# Patient Record
Sex: FEMALE | Race: WHITE | NOT HISPANIC OR LATINO | Employment: OTHER | URBAN - METROPOLITAN AREA
[De-identification: names, ages, dates, MRNs, and addresses within clinical notes are randomized per-mention and may not be internally consistent; named-entity substitution may affect disease eponyms.]

---

## 2017-01-17 ENCOUNTER — COMMUNICATION - HEALTHEAST (OUTPATIENT)
Dept: FAMILY MEDICINE | Facility: CLINIC | Age: 67
End: 2017-01-17

## 2017-03-10 ENCOUNTER — RECORDS - HEALTHEAST (OUTPATIENT)
Dept: ADMINISTRATIVE | Facility: OTHER | Age: 67
End: 2017-03-10

## 2017-04-06 ENCOUNTER — COMMUNICATION - HEALTHEAST (OUTPATIENT)
Dept: FAMILY MEDICINE | Facility: CLINIC | Age: 67
End: 2017-04-06

## 2017-05-01 ENCOUNTER — COMMUNICATION - HEALTHEAST (OUTPATIENT)
Dept: ADMINISTRATIVE | Facility: CLINIC | Age: 67
End: 2017-05-01

## 2017-05-05 ENCOUNTER — COMMUNICATION - HEALTHEAST (OUTPATIENT)
Dept: FAMILY MEDICINE | Facility: CLINIC | Age: 67
End: 2017-05-05

## 2017-05-09 ENCOUNTER — AMBULATORY - HEALTHEAST (OUTPATIENT)
Dept: ENDOCRINOLOGY | Facility: CLINIC | Age: 67
End: 2017-05-09

## 2017-05-09 DIAGNOSIS — M81.0 OSTEOPOROSIS: ICD-10-CM

## 2017-05-19 ENCOUNTER — OFFICE VISIT - HEALTHEAST (OUTPATIENT)
Dept: FAMILY MEDICINE | Facility: CLINIC | Age: 67
End: 2017-05-19

## 2017-05-19 DIAGNOSIS — Z91.09 OTHER ALLERGY, OTHER THAN TO MEDICINAL AGENTS: ICD-10-CM

## 2017-05-19 DIAGNOSIS — G25.81 RESTLESS LEGS SYNDROME (RLS): ICD-10-CM

## 2017-05-19 DIAGNOSIS — M81.0 OSTEOPOROSIS: ICD-10-CM

## 2017-05-19 DIAGNOSIS — Z00.00 HEALTH CARE MAINTENANCE: ICD-10-CM

## 2017-05-19 ASSESSMENT — MIFFLIN-ST. JEOR: SCORE: 1085.83

## 2017-06-26 ENCOUNTER — COMMUNICATION - HEALTHEAST (OUTPATIENT)
Dept: FAMILY MEDICINE | Facility: CLINIC | Age: 67
End: 2017-06-26

## 2017-06-26 DIAGNOSIS — R93.7 ABNORMAL BONE DENSITY SCREENING: ICD-10-CM

## 2017-06-26 DIAGNOSIS — M81.0 OSTEOPOROSIS: ICD-10-CM

## 2017-08-14 ENCOUNTER — COMMUNICATION - HEALTHEAST (OUTPATIENT)
Dept: FAMILY MEDICINE | Facility: CLINIC | Age: 67
End: 2017-08-14

## 2017-08-14 DIAGNOSIS — G25.81 RESTLESS LEGS SYNDROME (RLS): ICD-10-CM

## 2017-09-08 ENCOUNTER — RECORDS - HEALTHEAST (OUTPATIENT)
Dept: ADMINISTRATIVE | Facility: OTHER | Age: 67
End: 2017-09-08

## 2017-09-14 ENCOUNTER — RECORDS - HEALTHEAST (OUTPATIENT)
Dept: ADMINISTRATIVE | Facility: OTHER | Age: 67
End: 2017-09-14

## 2017-09-18 ENCOUNTER — COMMUNICATION - HEALTHEAST (OUTPATIENT)
Dept: FAMILY MEDICINE | Facility: CLINIC | Age: 67
End: 2017-09-18

## 2017-09-29 ENCOUNTER — OFFICE VISIT - HEALTHEAST (OUTPATIENT)
Dept: FAMILY MEDICINE | Facility: CLINIC | Age: 67
End: 2017-09-29

## 2017-09-29 DIAGNOSIS — Z23 NEED FOR IMMUNIZATION AGAINST INFLUENZA: ICD-10-CM

## 2017-09-29 DIAGNOSIS — Z01.810 PREOP CARDIOVASCULAR EXAM: ICD-10-CM

## 2017-09-29 ASSESSMENT — MIFFLIN-ST. JEOR: SCORE: 1091.27

## 2017-10-02 ENCOUNTER — COMMUNICATION - HEALTHEAST (OUTPATIENT)
Dept: FAMILY MEDICINE | Facility: CLINIC | Age: 67
End: 2017-10-02

## 2017-10-24 ENCOUNTER — RECORDS - HEALTHEAST (OUTPATIENT)
Dept: ADMINISTRATIVE | Facility: OTHER | Age: 67
End: 2017-10-24

## 2017-10-24 ENCOUNTER — OFFICE VISIT - HEALTHEAST (OUTPATIENT)
Dept: INTERNAL MEDICINE | Facility: CLINIC | Age: 67
End: 2017-10-24

## 2017-10-24 ENCOUNTER — RECORDS - HEALTHEAST (OUTPATIENT)
Dept: BONE DENSITY | Facility: CLINIC | Age: 67
End: 2017-10-24

## 2017-10-24 DIAGNOSIS — M81.0 OSTEOPOROSIS: ICD-10-CM

## 2017-10-24 DIAGNOSIS — M81.0 AGE-RELATED OSTEOPOROSIS WITHOUT CURRENT PATHOLOGICAL FRACTURE: ICD-10-CM

## 2017-10-24 DIAGNOSIS — R93.7 ABNORMAL FINDINGS ON DIAGNOSTIC IMAGING OF OTHER PARTS OF MUSCULOSKELETAL SYSTEM: ICD-10-CM

## 2017-11-14 ENCOUNTER — AMBULATORY - HEALTHEAST (OUTPATIENT)
Dept: ENDOCRINOLOGY | Facility: CLINIC | Age: 67
End: 2017-11-14

## 2017-11-14 DIAGNOSIS — M81.0 OSTEOPOROSIS: ICD-10-CM

## 2017-11-15 ENCOUNTER — RECORDS - HEALTHEAST (OUTPATIENT)
Dept: ADMINISTRATIVE | Facility: OTHER | Age: 67
End: 2017-11-15

## 2018-04-17 ENCOUNTER — AMBULATORY - HEALTHEAST (OUTPATIENT)
Dept: ENDOCRINOLOGY | Facility: CLINIC | Age: 68
End: 2018-04-17

## 2018-04-17 DIAGNOSIS — M81.0 OSTEOPOROSIS: ICD-10-CM

## 2018-04-25 ENCOUNTER — COMMUNICATION - HEALTHEAST (OUTPATIENT)
Dept: FAMILY MEDICINE | Facility: CLINIC | Age: 68
End: 2018-04-25

## 2018-05-10 ENCOUNTER — COMMUNICATION - HEALTHEAST (OUTPATIENT)
Dept: FAMILY MEDICINE | Facility: CLINIC | Age: 68
End: 2018-05-10

## 2018-05-10 DIAGNOSIS — G25.81 RESTLESS LEGS SYNDROME (RLS): ICD-10-CM

## 2018-05-14 ENCOUNTER — COMMUNICATION - HEALTHEAST (OUTPATIENT)
Dept: FAMILY MEDICINE | Facility: CLINIC | Age: 68
End: 2018-05-14

## 2018-05-14 DIAGNOSIS — G25.81 RESTLESS LEGS SYNDROME (RLS): ICD-10-CM

## 2018-05-21 ENCOUNTER — OFFICE VISIT - HEALTHEAST (OUTPATIENT)
Dept: FAMILY MEDICINE | Facility: CLINIC | Age: 68
End: 2018-05-21

## 2018-05-21 DIAGNOSIS — Z00.00 ROUTINE GENERAL MEDICAL EXAMINATION AT A HEALTH CARE FACILITY: ICD-10-CM

## 2018-05-21 DIAGNOSIS — H91.90 HEARING LOSS: ICD-10-CM

## 2018-05-21 DIAGNOSIS — G25.81 RESTLESS LEGS SYNDROME (RLS): ICD-10-CM

## 2018-05-21 DIAGNOSIS — Z91.09 ENVIRONMENTAL ALLERGIES: ICD-10-CM

## 2018-05-21 DIAGNOSIS — M81.0 AGE-RELATED OSTEOPOROSIS WITHOUT CURRENT PATHOLOGICAL FRACTURE: ICD-10-CM

## 2018-05-21 LAB
CHOLEST SERPL-MCNC: 217 MG/DL
FASTING STATUS PATIENT QL REPORTED: YES
FASTING STATUS PATIENT QL REPORTED: YES
GLUCOSE BLD-MCNC: 93 MG/DL (ref 70–99)
HDLC SERPL-MCNC: 75 MG/DL
LDLC SERPL CALC-MCNC: 126 MG/DL
TRIGL SERPL-MCNC: 82 MG/DL

## 2018-05-21 ASSESSMENT — MIFFLIN-ST. JEOR: SCORE: 1078.77

## 2018-05-22 LAB — HCV AB SERPL QL IA: NEGATIVE

## 2018-08-06 ENCOUNTER — COMMUNICATION - HEALTHEAST (OUTPATIENT)
Dept: FAMILY MEDICINE | Facility: CLINIC | Age: 68
End: 2018-08-06

## 2018-08-06 DIAGNOSIS — G25.81 RESTLESS LEGS SYNDROME (RLS): ICD-10-CM

## 2018-09-17 ENCOUNTER — OFFICE VISIT - HEALTHEAST (OUTPATIENT)
Dept: FAMILY MEDICINE | Facility: CLINIC | Age: 68
End: 2018-09-17

## 2018-09-17 DIAGNOSIS — G25.81 RESTLESS LEGS SYNDROME (RLS): ICD-10-CM

## 2018-09-17 DIAGNOSIS — N89.8 VAGINAL IRRITATION: ICD-10-CM

## 2018-09-17 LAB
CLUE CELLS: NORMAL
TRICHOMONAS, WET PREP: NORMAL
YEAST, WET PREP: NORMAL

## 2018-10-11 ENCOUNTER — COMMUNICATION - HEALTHEAST (OUTPATIENT)
Dept: INTERNAL MEDICINE | Facility: CLINIC | Age: 68
End: 2018-10-11

## 2018-10-23 ENCOUNTER — AMBULATORY - HEALTHEAST (OUTPATIENT)
Dept: NURSING | Facility: CLINIC | Age: 68
End: 2018-10-23

## 2018-10-25 ENCOUNTER — AMBULATORY - HEALTHEAST (OUTPATIENT)
Dept: NURSING | Facility: CLINIC | Age: 68
End: 2018-10-25

## 2018-10-25 ENCOUNTER — AMBULATORY - HEALTHEAST (OUTPATIENT)
Dept: INTERNAL MEDICINE | Facility: CLINIC | Age: 68
End: 2018-10-25

## 2018-10-25 DIAGNOSIS — M81.0 AGE-RELATED OSTEOPOROSIS WITHOUT CURRENT PATHOLOGICAL FRACTURE: ICD-10-CM

## 2018-10-26 ENCOUNTER — COMMUNICATION - HEALTHEAST (OUTPATIENT)
Dept: ADMINISTRATIVE | Facility: CLINIC | Age: 68
End: 2018-10-26

## 2018-10-30 ENCOUNTER — AMBULATORY - HEALTHEAST (OUTPATIENT)
Dept: NURSING | Facility: CLINIC | Age: 68
End: 2018-10-30

## 2018-11-15 ENCOUNTER — OFFICE VISIT - HEALTHEAST (OUTPATIENT)
Dept: FAMILY MEDICINE | Facility: CLINIC | Age: 68
End: 2018-11-15

## 2018-11-15 DIAGNOSIS — J01.90 ACUTE SINUSITIS WITH SYMPTOMS > 10 DAYS: ICD-10-CM

## 2018-11-19 ENCOUNTER — RECORDS - HEALTHEAST (OUTPATIENT)
Dept: ADMINISTRATIVE | Facility: OTHER | Age: 68
End: 2018-11-19

## 2018-11-28 ENCOUNTER — RECORDS - HEALTHEAST (OUTPATIENT)
Dept: ADMINISTRATIVE | Facility: OTHER | Age: 68
End: 2018-11-28

## 2019-05-28 ENCOUNTER — OFFICE VISIT - HEALTHEAST (OUTPATIENT)
Dept: FAMILY MEDICINE | Facility: CLINIC | Age: 69
End: 2019-05-28

## 2019-05-28 DIAGNOSIS — Z91.09 ENVIRONMENTAL ALLERGIES: ICD-10-CM

## 2019-05-28 DIAGNOSIS — Z00.00 ROUTINE GENERAL MEDICAL EXAMINATION AT A HEALTH CARE FACILITY: ICD-10-CM

## 2019-05-28 DIAGNOSIS — M81.0 AGE-RELATED OSTEOPOROSIS WITHOUT CURRENT PATHOLOGICAL FRACTURE: ICD-10-CM

## 2019-05-28 DIAGNOSIS — J32.9 CHRONIC SINUSITIS, UNSPECIFIED LOCATION: ICD-10-CM

## 2019-05-28 DIAGNOSIS — H91.93 BILATERAL HEARING LOSS, UNSPECIFIED HEARING LOSS TYPE: ICD-10-CM

## 2019-05-28 DIAGNOSIS — H90.3 BILATERAL SENSORINEURAL HEARING LOSS: ICD-10-CM

## 2019-05-28 DIAGNOSIS — G25.81 RESTLESS LEGS SYNDROME (RLS): ICD-10-CM

## 2019-05-28 ASSESSMENT — MIFFLIN-ST. JEOR: SCORE: 1084.54

## 2019-09-30 ENCOUNTER — COMMUNICATION - HEALTHEAST (OUTPATIENT)
Dept: FAMILY MEDICINE | Facility: CLINIC | Age: 69
End: 2019-09-30

## 2019-10-17 ENCOUNTER — OFFICE VISIT - HEALTHEAST (OUTPATIENT)
Dept: FAMILY MEDICINE | Facility: CLINIC | Age: 69
End: 2019-10-17

## 2019-10-17 DIAGNOSIS — Z76.89 ESTABLISHING CARE WITH NEW DOCTOR, ENCOUNTER FOR: ICD-10-CM

## 2019-10-17 DIAGNOSIS — J06.9 VIRAL URI WITH COUGH: ICD-10-CM

## 2019-10-17 DIAGNOSIS — Z01.818 ENCOUNTER FOR PREOPERATIVE EXAMINATION FOR GENERAL SURGICAL PROCEDURE: ICD-10-CM

## 2019-10-17 DIAGNOSIS — G25.81 RESTLESS LEGS SYNDROME (RLS): ICD-10-CM

## 2019-10-17 DIAGNOSIS — M81.0 AGE-RELATED OSTEOPOROSIS WITHOUT CURRENT PATHOLOGICAL FRACTURE: ICD-10-CM

## 2019-10-17 LAB
ALBUMIN SERPL-MCNC: 4 G/DL (ref 3.5–5)
ALP SERPL-CCNC: 102 U/L (ref 45–120)
ALT SERPL W P-5'-P-CCNC: 18 U/L (ref 0–45)
ANION GAP SERPL CALCULATED.3IONS-SCNC: 12 MMOL/L (ref 5–18)
AST SERPL W P-5'-P-CCNC: 17 U/L (ref 0–40)
BILIRUB SERPL-MCNC: 0.3 MG/DL (ref 0–1)
BUN SERPL-MCNC: 14 MG/DL (ref 8–22)
CALCIUM SERPL-MCNC: 9.7 MG/DL (ref 8.5–10.5)
CHLORIDE BLD-SCNC: 102 MMOL/L (ref 98–107)
CO2 SERPL-SCNC: 26 MMOL/L (ref 22–31)
CREAT SERPL-MCNC: 0.67 MG/DL (ref 0.6–1.1)
ERYTHROCYTE [DISTWIDTH] IN BLOOD BY AUTOMATED COUNT: 11.2 % (ref 11–14.5)
FERRITIN SERPL-MCNC: 88 NG/ML (ref 10–130)
GFR SERPL CREATININE-BSD FRML MDRD: >60 ML/MIN/1.73M2
GLUCOSE BLD-MCNC: 102 MG/DL (ref 70–125)
HCT VFR BLD AUTO: 42.3 % (ref 35–47)
HGB BLD-MCNC: 14.3 G/DL (ref 12–16)
MCH RBC QN AUTO: 30.4 PG (ref 27–34)
MCHC RBC AUTO-ENTMCNC: 33.7 G/DL (ref 32–36)
MCV RBC AUTO: 90 FL (ref 80–100)
PLATELET # BLD AUTO: 249 THOU/UL (ref 140–440)
PMV BLD AUTO: 7.6 FL (ref 7–10)
POTASSIUM BLD-SCNC: 4.4 MMOL/L (ref 3.5–5)
PROT SERPL-MCNC: 6.7 G/DL (ref 6–8)
RBC # BLD AUTO: 4.7 MILL/UL (ref 3.8–5.4)
SODIUM SERPL-SCNC: 140 MMOL/L (ref 136–145)
WBC: 8.7 THOU/UL (ref 4–11)

## 2019-10-17 ASSESSMENT — MIFFLIN-ST. JEOR: SCORE: 1098.34

## 2019-10-18 LAB
25(OH)D3 SERPL-MCNC: 38.1 NG/ML (ref 30–80)
25(OH)D3 SERPL-MCNC: 38.1 NG/ML (ref 30–80)

## 2019-11-20 ENCOUNTER — RECORDS - HEALTHEAST (OUTPATIENT)
Dept: ADMINISTRATIVE | Facility: OTHER | Age: 69
End: 2019-11-20

## 2019-11-20 ENCOUNTER — OFFICE VISIT - HEALTHEAST (OUTPATIENT)
Dept: INTERNAL MEDICINE | Facility: CLINIC | Age: 69
End: 2019-11-20

## 2019-11-20 ENCOUNTER — RECORDS - HEALTHEAST (OUTPATIENT)
Dept: BONE DENSITY | Facility: CLINIC | Age: 69
End: 2019-11-20

## 2019-11-20 DIAGNOSIS — M81.0 AGE-RELATED OSTEOPOROSIS WITHOUT CURRENT PATHOLOGICAL FRACTURE: ICD-10-CM

## 2019-11-20 DIAGNOSIS — Z92.29 PERSONAL HISTORY OF OTHER DRUG THERAPY: ICD-10-CM

## 2019-11-21 ENCOUNTER — COMMUNICATION - HEALTHEAST (OUTPATIENT)
Dept: INTERNAL MEDICINE | Facility: CLINIC | Age: 69
End: 2019-11-21

## 2019-11-26 ENCOUNTER — AMBULATORY - HEALTHEAST (OUTPATIENT)
Dept: NURSING | Facility: CLINIC | Age: 69
End: 2019-11-26

## 2019-12-10 ENCOUNTER — RECORDS - HEALTHEAST (OUTPATIENT)
Dept: ADMINISTRATIVE | Facility: OTHER | Age: 69
End: 2019-12-10

## 2020-01-17 ENCOUNTER — OFFICE VISIT - HEALTHEAST (OUTPATIENT)
Dept: FAMILY MEDICINE | Facility: CLINIC | Age: 70
End: 2020-01-17

## 2020-01-17 DIAGNOSIS — M67.432 GANGLION CYST OF WRIST, LEFT: ICD-10-CM

## 2020-01-17 DIAGNOSIS — G25.81 RESTLESS LEGS SYNDROME (RLS): ICD-10-CM

## 2020-04-10 ENCOUNTER — OFFICE VISIT - HEALTHEAST (OUTPATIENT)
Dept: FAMILY MEDICINE | Facility: CLINIC | Age: 70
End: 2020-04-10

## 2020-04-10 DIAGNOSIS — R10.13 ABDOMINAL PAIN, EPIGASTRIC: ICD-10-CM

## 2020-04-10 ASSESSMENT — PATIENT HEALTH QUESTIONNAIRE - PHQ9: SUM OF ALL RESPONSES TO PHQ QUESTIONS 1-9: 0

## 2020-04-24 ENCOUNTER — COMMUNICATION - HEALTHEAST (OUTPATIENT)
Dept: FAMILY MEDICINE | Facility: CLINIC | Age: 70
End: 2020-04-24

## 2020-05-02 ENCOUNTER — COMMUNICATION - HEALTHEAST (OUTPATIENT)
Dept: FAMILY MEDICINE | Facility: CLINIC | Age: 70
End: 2020-05-02

## 2020-05-02 DIAGNOSIS — R10.13 ABDOMINAL PAIN, EPIGASTRIC: ICD-10-CM

## 2020-05-29 ENCOUNTER — AMBULATORY - HEALTHEAST (OUTPATIENT)
Dept: NURSING | Facility: CLINIC | Age: 70
End: 2020-05-29

## 2020-06-09 ENCOUNTER — COMMUNICATION - HEALTHEAST (OUTPATIENT)
Dept: FAMILY MEDICINE | Facility: CLINIC | Age: 70
End: 2020-06-09

## 2020-06-09 DIAGNOSIS — J32.9 CHRONIC SINUSITIS, UNSPECIFIED LOCATION: ICD-10-CM

## 2020-06-09 DIAGNOSIS — Z91.09 ENVIRONMENTAL ALLERGIES: ICD-10-CM

## 2020-06-11 RX ORDER — AZELASTINE 1 MG/ML
SPRAY, METERED NASAL
Qty: 30 ML | Refills: 12 | Status: SHIPPED | OUTPATIENT
Start: 2020-06-11 | End: 2022-04-15

## 2020-07-13 ENCOUNTER — OFFICE VISIT - HEALTHEAST (OUTPATIENT)
Dept: FAMILY MEDICINE | Facility: CLINIC | Age: 70
End: 2020-07-13

## 2020-07-13 DIAGNOSIS — G25.81 RESTLESS LEGS SYNDROME (RLS): ICD-10-CM

## 2020-07-13 DIAGNOSIS — K62.1 ANORECTAL POLYP: ICD-10-CM

## 2020-07-13 DIAGNOSIS — K62.0 ANORECTAL POLYP: ICD-10-CM

## 2020-07-13 DIAGNOSIS — Z00.01 ENCOUNTER FOR GENERAL ADULT MEDICAL EXAMINATION WITH ABNORMAL FINDINGS: ICD-10-CM

## 2020-07-13 DIAGNOSIS — R10.13 ABDOMINAL PAIN, EPIGASTRIC: ICD-10-CM

## 2020-07-13 DIAGNOSIS — M81.0 AGE-RELATED OSTEOPOROSIS WITHOUT CURRENT PATHOLOGICAL FRACTURE: ICD-10-CM

## 2020-07-13 ASSESSMENT — MIFFLIN-ST. JEOR: SCORE: 1074.72

## 2020-07-13 ASSESSMENT — ANXIETY QUESTIONNAIRES
GAD7 TOTAL SCORE: 2
2. NOT BEING ABLE TO STOP OR CONTROL WORRYING: NOT AT ALL
4. TROUBLE RELAXING: NOT AT ALL
IF YOU CHECKED OFF ANY PROBLEMS ON THIS QUESTIONNAIRE, HOW DIFFICULT HAVE THESE PROBLEMS MADE IT FOR YOU TO DO YOUR WORK, TAKE CARE OF THINGS AT HOME, OR GET ALONG WITH OTHER PEOPLE: SOMEWHAT DIFFICULT
1. FEELING NERVOUS, ANXIOUS, OR ON EDGE: SEVERAL DAYS
5. BEING SO RESTLESS THAT IT IS HARD TO SIT STILL: NOT AT ALL
7. FEELING AFRAID AS IF SOMETHING AWFUL MIGHT HAPPEN: NOT AT ALL
6. BECOMING EASILY ANNOYED OR IRRITABLE: NOT AT ALL
3. WORRYING TOO MUCH ABOUT DIFFERENT THINGS: SEVERAL DAYS

## 2020-07-13 ASSESSMENT — PATIENT HEALTH QUESTIONNAIRE - PHQ9: SUM OF ALL RESPONSES TO PHQ QUESTIONS 1-9: 2

## 2020-07-29 ENCOUNTER — AMBULATORY - HEALTHEAST (OUTPATIENT)
Dept: LAB | Facility: CLINIC | Age: 70
End: 2020-07-29

## 2020-07-29 DIAGNOSIS — Z00.01 ENCOUNTER FOR GENERAL ADULT MEDICAL EXAMINATION WITH ABNORMAL FINDINGS: ICD-10-CM

## 2020-07-29 LAB
ALBUMIN SERPL-MCNC: 4.2 G/DL (ref 3.5–5)
ALP SERPL-CCNC: 70 U/L (ref 45–120)
ALT SERPL W P-5'-P-CCNC: 22 U/L (ref 0–45)
ANION GAP SERPL CALCULATED.3IONS-SCNC: 7 MMOL/L (ref 5–18)
AST SERPL W P-5'-P-CCNC: 22 U/L (ref 0–40)
BILIRUB SERPL-MCNC: 1 MG/DL (ref 0–1)
BUN SERPL-MCNC: 16 MG/DL (ref 8–22)
CALCIUM SERPL-MCNC: 8.9 MG/DL (ref 8.5–10.5)
CHLORIDE BLD-SCNC: 102 MMOL/L (ref 98–107)
CHOLEST SERPL-MCNC: 255 MG/DL
CO2 SERPL-SCNC: 32 MMOL/L (ref 22–31)
CREAT SERPL-MCNC: 0.71 MG/DL (ref 0.6–1.1)
FASTING STATUS PATIENT QL REPORTED: YES
GFR SERPL CREATININE-BSD FRML MDRD: >60 ML/MIN/1.73M2
GLUCOSE BLD-MCNC: 80 MG/DL (ref 70–125)
HDLC SERPL-MCNC: 76 MG/DL
LDLC SERPL CALC-MCNC: 164 MG/DL
POTASSIUM BLD-SCNC: 4.2 MMOL/L (ref 3.5–5)
PROT SERPL-MCNC: 6.8 G/DL (ref 6–8)
SODIUM SERPL-SCNC: 141 MMOL/L (ref 136–145)
TRIGL SERPL-MCNC: 77 MG/DL

## 2020-08-03 ENCOUNTER — COMMUNICATION - HEALTHEAST (OUTPATIENT)
Dept: FAMILY MEDICINE | Facility: CLINIC | Age: 70
End: 2020-08-03

## 2020-08-03 DIAGNOSIS — E78.5 HYPERLIPIDEMIA, UNSPECIFIED HYPERLIPIDEMIA TYPE: ICD-10-CM

## 2020-08-26 ENCOUNTER — COMMUNICATION - HEALTHEAST (OUTPATIENT)
Dept: FAMILY MEDICINE | Facility: CLINIC | Age: 70
End: 2020-08-26

## 2020-08-26 DIAGNOSIS — E78.5 HYPERLIPIDEMIA, UNSPECIFIED HYPERLIPIDEMIA TYPE: ICD-10-CM

## 2020-11-30 ENCOUNTER — COMMUNICATION - HEALTHEAST (OUTPATIENT)
Dept: FAMILY MEDICINE | Facility: CLINIC | Age: 70
End: 2020-11-30

## 2020-11-30 DIAGNOSIS — G25.81 RESTLESS LEGS SYNDROME (RLS): ICD-10-CM

## 2020-12-01 RX ORDER — PRAMIPEXOLE DIHYDROCHLORIDE 0.25 MG/1
TABLET ORAL
Qty: 180 TABLET | Refills: 1 | Status: SHIPPED | OUTPATIENT
Start: 2020-12-01 | End: 2021-07-19

## 2020-12-07 ENCOUNTER — AMBULATORY - HEALTHEAST (OUTPATIENT)
Dept: NURSING | Facility: CLINIC | Age: 70
End: 2020-12-07

## 2020-12-23 ENCOUNTER — RECORDS - HEALTHEAST (OUTPATIENT)
Dept: ADMINISTRATIVE | Facility: OTHER | Age: 70
End: 2020-12-23

## 2020-12-29 ENCOUNTER — COMMUNICATION - HEALTHEAST (OUTPATIENT)
Dept: FAMILY MEDICINE | Facility: CLINIC | Age: 70
End: 2020-12-29

## 2020-12-31 ENCOUNTER — RECORDS - HEALTHEAST (OUTPATIENT)
Dept: GENERAL RADIOLOGY | Facility: CLINIC | Age: 70
End: 2020-12-31

## 2020-12-31 ENCOUNTER — OFFICE VISIT - HEALTHEAST (OUTPATIENT)
Dept: FAMILY MEDICINE | Facility: CLINIC | Age: 70
End: 2020-12-31

## 2020-12-31 DIAGNOSIS — M53.3 SACROCOCCYGEAL DISORDERS, NOT ELSEWHERE CLASSIFIED: ICD-10-CM

## 2020-12-31 DIAGNOSIS — M53.3 PAIN IN THE COCCYX: ICD-10-CM
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
## 2021-02-26 ENCOUNTER — RECORDS - HEALTHEAST (OUTPATIENT)
Dept: ADMINISTRATIVE | Facility: OTHER | Age: 71
End: 2021-02-26

## 2021-04-09 ENCOUNTER — RECORDS - HEALTHEAST (OUTPATIENT)
Dept: ADMINISTRATIVE | Facility: OTHER | Age: 71
End: 2021-04-09

## 2021-05-03 ENCOUNTER — COMMUNICATION - HEALTHEAST (OUTPATIENT)
Dept: SCHEDULING | Facility: CLINIC | Age: 71
End: 2021-05-03

## 2021-05-03 ENCOUNTER — OFFICE VISIT - HEALTHEAST (OUTPATIENT)
Dept: FAMILY MEDICINE | Facility: CLINIC | Age: 71
End: 2021-05-03

## 2021-05-03 DIAGNOSIS — N93.9 VAGINAL BLEEDING: ICD-10-CM

## 2021-05-03 LAB
ALBUMIN SERPL-MCNC: 4.7 G/DL (ref 3.5–5)
ALBUMIN UR-MCNC: NEGATIVE G/DL
ALP SERPL-CCNC: 76 U/L (ref 45–120)
ALT SERPL W P-5'-P-CCNC: 43 U/L (ref 0–45)
ANION GAP SERPL CALCULATED.3IONS-SCNC: 12 MMOL/L (ref 5–18)
APPEARANCE UR: CLEAR
AST SERPL W P-5'-P-CCNC: 34 U/L (ref 0–40)
BASOPHILS # BLD AUTO: 0 THOU/UL (ref 0–0.2)
BASOPHILS NFR BLD AUTO: 0 % (ref 0–2)
BILIRUB SERPL-MCNC: 0.9 MG/DL (ref 0–1)
BILIRUB UR QL STRIP: NEGATIVE
BUN SERPL-MCNC: 14 MG/DL (ref 8–28)
CALCIUM SERPL-MCNC: 9.9 MG/DL (ref 8.5–10.5)
CHLORIDE BLD-SCNC: 102 MMOL/L (ref 98–107)
CO2 SERPL-SCNC: 27 MMOL/L (ref 22–31)
COLOR UR AUTO: YELLOW
CREAT SERPL-MCNC: 0.73 MG/DL (ref 0.6–1.1)
EOSINOPHIL # BLD AUTO: 0 THOU/UL (ref 0–0.4)
EOSINOPHIL NFR BLD AUTO: 0 % (ref 0–6)
ERYTHROCYTE [DISTWIDTH] IN BLOOD BY AUTOMATED COUNT: 11.6 % (ref 11–14.5)
ERYTHROCYTE [SEDIMENTATION RATE] IN BLOOD BY WESTERGREN METHOD: 6 MM/HR (ref 0–20)
GFR SERPL CREATININE-BSD FRML MDRD: >60 ML/MIN/1.73M2
GLUCOSE BLD-MCNC: 114 MG/DL (ref 70–125)
GLUCOSE UR STRIP-MCNC: NEGATIVE MG/DL
HCT VFR BLD AUTO: 44.2 % (ref 35–47)
HGB BLD-MCNC: 15 G/DL (ref 12–16)
HGB UR QL STRIP: NEGATIVE
IMM GRANULOCYTES # BLD: 0 THOU/UL
IMM GRANULOCYTES NFR BLD: 0 %
KETONES UR STRIP-MCNC: ABNORMAL MG/DL
LDH SERPL L TO P-CCNC: 183 U/L (ref 125–220)
LEUKOCYTE ESTERASE UR QL STRIP: NEGATIVE
LYMPHOCYTES # BLD AUTO: 1.8 THOU/UL (ref 0.8–4.4)
LYMPHOCYTES NFR BLD AUTO: 24 % (ref 20–40)
MCH RBC QN AUTO: 29.9 PG (ref 27–34)
MCHC RBC AUTO-ENTMCNC: 33.9 G/DL (ref 32–36)
MCV RBC AUTO: 88 FL (ref 80–100)
MONOCYTES # BLD AUTO: 0.4 THOU/UL (ref 0–0.9)
MONOCYTES NFR BLD AUTO: 5 % (ref 2–10)
NEUTROPHILS # BLD AUTO: 5.2 THOU/UL (ref 2–7.7)
NEUTROPHILS NFR BLD AUTO: 70 % (ref 50–70)
NITRATE UR QL: NEGATIVE
PH UR STRIP: 6 [PH] (ref 5–8)
PLATELET # BLD AUTO: 250 THOU/UL (ref 140–440)
PMV BLD AUTO: 10 FL (ref 7–10)
POTASSIUM BLD-SCNC: 4.6 MMOL/L (ref 3.5–5)
PROT SERPL-MCNC: 7.3 G/DL (ref 6–8)
RBC # BLD AUTO: 5.02 MILL/UL (ref 3.8–5.4)
SODIUM SERPL-SCNC: 141 MMOL/L (ref 136–145)
SP GR UR STRIP: 1.02 (ref 1–1.03)
UROBILINOGEN UR STRIP-ACNC: ABNORMAL
WBC: 7.4 THOU/UL (ref 4–11)

## 2021-05-06 ENCOUNTER — COMMUNICATION - HEALTHEAST (OUTPATIENT)
Dept: FAMILY MEDICINE | Facility: CLINIC | Age: 71
End: 2021-05-06

## 2021-05-10 ENCOUNTER — COMMUNICATION - HEALTHEAST (OUTPATIENT)
Dept: INTERNAL MEDICINE | Facility: CLINIC | Age: 71
End: 2021-05-10

## 2021-05-13 ENCOUNTER — OFFICE VISIT - HEALTHEAST (OUTPATIENT)
Dept: OBGYN | Facility: CLINIC | Age: 71
End: 2021-05-13

## 2021-05-13 DIAGNOSIS — N95.0 PMB (POSTMENOPAUSAL BLEEDING): ICD-10-CM

## 2021-05-19 ENCOUNTER — COMMUNICATION - HEALTHEAST (OUTPATIENT)
Dept: FAMILY MEDICINE | Facility: CLINIC | Age: 71
End: 2021-05-19

## 2021-05-19 DIAGNOSIS — E78.5 HYPERLIPIDEMIA, UNSPECIFIED HYPERLIPIDEMIA TYPE: ICD-10-CM

## 2021-05-20 RX ORDER — PRAVASTATIN SODIUM 20 MG
TABLET ORAL
Qty: 90 TABLET | Refills: 3 | Status: SHIPPED | OUTPATIENT
Start: 2021-05-20 | End: 2021-07-19

## 2021-05-26 VITALS — SYSTOLIC BLOOD PRESSURE: 110 MMHG | HEART RATE: 62 BPM | DIASTOLIC BLOOD PRESSURE: 60 MMHG

## 2021-05-26 ASSESSMENT — PATIENT HEALTH QUESTIONNAIRE - PHQ9: SUM OF ALL RESPONSES TO PHQ QUESTIONS 1-9: 2

## 2021-05-27 VITALS
WEIGHT: 136 LBS | HEART RATE: 71 BPM | DIASTOLIC BLOOD PRESSURE: 68 MMHG | OXYGEN SATURATION: 99 % | SYSTOLIC BLOOD PRESSURE: 134 MMHG

## 2021-05-27 VITALS
OXYGEN SATURATION: 97 % | HEART RATE: 90 BPM | WEIGHT: 135 LBS | DIASTOLIC BLOOD PRESSURE: 68 MMHG | SYSTOLIC BLOOD PRESSURE: 138 MMHG

## 2021-05-27 ASSESSMENT — PATIENT HEALTH QUESTIONNAIRE - PHQ9: SUM OF ALL RESPONSES TO PHQ QUESTIONS 1-9: 0

## 2021-05-28 ASSESSMENT — ANXIETY QUESTIONNAIRES: GAD7 TOTAL SCORE: 2

## 2021-05-29 NOTE — PROGRESS NOTES
Assessment and Plan:       1. Routine general medical examination at a health care facility  Immunizations reviewed and utd---she will do shingles vaccine at pharmacy  Colonosocopy reviewed 9/12--history of polyp, repeat 9/17.  Mammography reviewed --done 11/18--abnormality followed up with ultrasound, routine screening recommended.  Pap reviewed--normal 5/14 with negative HPV, no need to repeat based on age and risk factors per current guidelines.  Routine Dental and Eye care recommended  Discussed importance of regular exercise and appropriate calcium intake    2. Bilateral sensorineural hearing loss  Patient has had audiology appointment within the last few months and is scheduled for new hearing aids later this year.    3. Restless Legs Syndrome  Doing well with Mirapex, refilled today and revisit annually.  - pramipexole (MIRAPEX) 0.25 MG tablet; TAKE 2 TABLETs BY MOUTH AT BEDTIME 2 HRS BEFORE SYMPTOMS OCCUR (4MG MAX/DAY)  Dispense: 180 tablet; Refill: 1    4. Chronic sinusitis, unspecified location  Patient requested refill of Astelin today for seasonal allergies.  Refill provided.  Encouraged her to follow-up with any palms or concerns.  Prior to flying, try Afrin or similar nasal decongestant spray 1 hour prior to flight.  - azelastine (ASTELIN) 137 mcg (0.1 %) nasal spray; 2 sprays each nostril once daily  Dispense: 30 mL; Refill: 12    5. Environmental allergies  As above.  Over-the-counter Zyrtec as needed as well.  - azelastine (ASTELIN) 137 mcg (0.1 %) nasal spray; 2 sprays each nostril once daily  Dispense: 30 mL; Refill: 12    6. Age-related osteoporosis without current pathological fracture  Follow-up with endocrine/osteoporosis specialist end of year for repeat DEXA and reevaluation regarding restart of Prolia.  Recommend calcium intake (12-1500mg/day) with vitamin D (800-1000 IU daily) as well as weight bearing exercise to include strength and balance.  Calcium supplement should be calcium  CITRATE          The patient's current medical problems were reviewed.      The following health maintenance schedule was reviewed with the patient and provided in printed form in the after visit summary:   Health Maintenance   Topic Date Due     ZOSTER VACCINES (2 of 3) 02/13/2013     FALL RISK ASSESSMENT  05/21/2019     DXA SCAN  10/24/2019     MAMMOGRAM  11/28/2020     TD 18+ HE  05/04/2022     COLONOSCOPY  09/08/2022     ADVANCE DIRECTIVES DISCUSSED WITH PATIENT  05/21/2023     PNEUMOCOCCAL POLYSACCHARIDE VACCINE AGE 65 AND OVER  Completed     INFLUENZA VACCINE RULE BASED  Completed     PNEUMOCOCCAL CONJUGATE VACCINE FOR ADULTS (PCV13 OR PREVNAR)  Completed        Subjective:   Chief Complaint: Areli Higginbotham is an 68 y.o. female here for an Annual Wellness visit.   HPI: She is not fasting today and has no specific concerns other than she would like refill of her Astelin nasal spray for management of her seasonal allergies/sinusitis issues as well as her Mirapex which she is taking for her restless legs.  She reports both medications are working well.  She is compliant with meds and has no side effects or concerns.  Patient does have osteoporosis.  She was on Prolia for couple years.  Due to some unexplained jaw pain and concern about some early necrosis of the jaw, she was pulled off the Prolia for 12-month hiatus.  Patient is following up with specialist again this fall for repeat of her bone density and reconsidering Prolia.  She is working on her calcium intake, exercising routinely.  No history of fall or fracture.  Patient reports she is due for replacement of her hearing aids.  She saw audiology with only a 5% decrease since her last visit.  She is pleased with this.  Zoila reports no bowel or bladder concerns.  She remains sexually active though rare intercourse due to 's erectile dysfunction.  She has no vaginal concerns regarding pain, discharge, irritation.    Review of Systems:  Please see  above.  The rest of the review of systems are negative for all systems.    Patient Care Team:  Angelita Glasgow MD as PCP - General  Fabricio Miller MD as Physician (Internal Medicine)     Patient Active Problem List   Diagnosis     Rectal Polyps     Restless Legs Syndrome     Environmental allergies     Osteoporosis     Chronic Sinusitis     Hearing loss     Lumbago     Past Medical History:   Diagnosis Date     Osteoporosis      Restless legs syndrome (RLS)       Past Surgical History:   Procedure Laterality Date     TX BIOPSY OF BREAST, INCISIONAL      Description: Biopsy Breast Open;  Proc Date: 09/01/2003;     TX HEMORRHOIDECTOMY INTERNAL RUBBER BAND LIGATIONS      Description: Hemorrhoidectomy;  Proc Date: 06/01/1999;     TX KESSLER W/O FACETEC FORAMOT/DSKC 1/2 VRT SEG, LUMBAR      Description: Laminectomy Decompressive Up To Two Lumbar Segments;  Recorded: 04/29/2008;  Comments: L4-5     TX OPEN TX PHALANGEAL SHAFT FRACTURE PROX/MIDDLE EA      Description: Open Treatment Of Each Fracture Of Proximal Finger Phalanx;  Recorded: 04/29/2008;  Comments: R 5th finger w/ pinning     TX PARTIAL REMOVAL OF HYMEN      Description: Partial Hymenectomy;  Recorded: 04/29/2008;     TX REMOVE TOTAL DISC ARTHROPLASTY, LUMBAR, SINGLE      Description: Total Disc Arthroplasty Removal Lumbar Single Interspace;  Recorded: 04/29/2008;  Comments: L5      Family History   Problem Relation Age of Onset     Hypertension Mother      Cancer Mother         ovarian     Osteoporosis Mother      Heart disease Father       Social History     Socioeconomic History     Marital status:      Spouse name: Not on file     Number of children: Not on file     Years of education: Not on file     Highest education level: Not on file   Occupational History     Not on file   Social Needs     Financial resource strain: Not on file     Food insecurity:     Worry: Not on file     Inability: Not on file     Transportation needs:     Medical: Not on file      Non-medical: Not on file   Tobacco Use     Smoking status: Never Smoker     Smokeless tobacco: Never Used   Substance and Sexual Activity     Alcohol use: Yes     Comment: 2 drinks/week     Drug use: No     Sexual activity: Yes     Partners: Male     Birth control/protection: None   Lifestyle     Physical activity:     Days per week: Not on file     Minutes per session: Not on file     Stress: Not on file   Relationships     Social connections:     Talks on phone: Not on file     Gets together: Not on file     Attends [employer] service: Not on file     Active member of club or organization: Not on file     Attends meetings of clubs or organizations: Not on file     Relationship status: Not on file     Intimate partner violence:     Fear of current or ex partner: Not on file     Emotionally abused: Not on file     Physically abused: Not on file     Forced sexual activity: Not on file   Other Topics Concern     Not on file   Social History Narrative     Not on file      Current Outpatient Medications   Medication Sig Dispense Refill     acetaminophen (TYLENOL) 325 MG tablet Take 650 mg by mouth as needed for pain.       ascorbic acid (VITAMIN C) 1000 MG tablet Take 500 mg by mouth daily.        azelastine (ASTELIN) 137 mcg (0.1 %) nasal spray 2 sprays each nostril once daily 30 mL 12     cetirizine (ZYRTEC) 10 MG tablet Take 10 mg by mouth as needed for allergies.       cholecalciferol, vitamin D3, (VITAMIN D3) 2,000 unit cap Take 3,000 Units by mouth daily.        denosumab 60 mg/mL Syrg Inject 60 mg under the skin.       pramipexole (MIRAPEX) 0.25 MG tablet TAKE 1-2 TABLET BY MOUTH AT BEDTIME 2 HRS BEFORE SYMPTOMS OCCUR (4MG MAX/DAY) 180 tablet 1     VIT C/VIT E AC/LUT/COPPER/ZINC (PRESERVISION LUTEIN ORAL) Take 1 capsule by mouth daily.       Current Facility-Administered Medications   Medication Dose Route Frequency Provider Last Rate Last Dose     denosumab 60 mg (PROLIA 60 mg/ml)  60 mg Subcutaneous Q6  "Months Fabricio Miller MD   60 mg at 10/30/18 0947      Objective:   Vital Signs:   Visit Vitals  /66 (Patient Site: Left Arm, Patient Position: Sitting, Cuff Size: Adult Regular)   Pulse 70   Temp 97.7  F (36.5  C) (Oral)   Resp 16   Ht 5' 2.05\" (1.576 m)   Wt 134 lb 9.6 oz (61.1 kg)   BMI 24.58 kg/m         VisionScreening:  No exam data present     PHYSICAL EXAM      General Appearance:    Alert, cooperative, no distress, appears stated age   Head:    Normocephalic, without obvious abnormality, atraumatic   Eyes:    PERRL, conjunctiva/corneas clear, EOM's intact both eyes   Ears:   Hearing aids removed for examination.  Normal TM's and external ear canals, both ears--- minimal cerumen   Throat:   Lips, mucosa, and tongue normal; teeth and gums normal   Neck:   Supple, symmetrical, trachea midline, no adenopathy;     thyroid:  no enlargement/tenderness/nodules; no carotid    bruit or JVD   Back:     Symmetric, no curvature, no CVA tenderness   Lungs:     Clear to auscultation bilaterally, respirations unlabored   Chest Wall:    No tenderness or deformity    Heart:    Regular rate and rhythm, S1 and S2 normal, no murmur, rub   or gallop   Breast Exam:    No tenderness, masses, or nipple abnormality--dense tissue bilaterally.   Abdomen:     Soft, non-tender, bowel sounds active all four quadrants,     no masses, no organomegaly   Genitalia:   Normal external female genitalia with atrophic changes.  No irritation, redness, discharge at the introitus.   Rectal:   Normal perirectal tissue.   Extremities:   Extremities normal, atraumatic, no cyanosis or edema   Pulses:   2+ and symmetric all extremities   Skin:   Skin color, texture, turgor normal, no rashes or lesions   Neurologic:   CNII-XII intact             Assessment Results 5/28/2019   Activities of Daily Living No help needed   Instrumental Activities of Daily Living No help needed   Mini Cog Total Score 5   Some recent data might be hidden     A Mini-Cog " score of 0-2 suggests the possibility of dementia, score of 3-5 suggests no dementia    Identified Health Risks:     The patient was provided with written information regarding signs of hearing loss.

## 2021-05-31 VITALS — WEIGHT: 131 LBS | BODY MASS INDEX: 22.36 KG/M2 | HEIGHT: 64 IN

## 2021-05-31 VITALS — HEIGHT: 64 IN | BODY MASS INDEX: 22.16 KG/M2 | WEIGHT: 129.8 LBS

## 2021-05-31 VITALS — WEIGHT: 133 LBS | BODY MASS INDEX: 23.19 KG/M2

## 2021-06-01 VITALS — BODY MASS INDEX: 23.28 KG/M2 | WEIGHT: 131.4 LBS | HEIGHT: 63 IN

## 2021-06-01 NOTE — TELEPHONE ENCOUNTER
New Appointment Needed  What is the reason for the visit:    Pre-Op Appt Request  When is the surgery? :  10/24/19  Where is the surgery?:   [facility]  Who is the surgeon? :  Dr Price  What type of surgery is being done?: Laser procedure on the left eye  Provider Preference: Dr Butcher, patient would like to establish care with Dr Butcher & is in need of a pre-op  How soon do you need to be seen?: before 10/24/19  Waitlist offered?: No  Okay to leave a detailed message:  Yes

## 2021-06-02 VITALS — WEIGHT: 136 LBS | BODY MASS INDEX: 24.4 KG/M2

## 2021-06-02 VITALS — WEIGHT: 134.6 LBS | BODY MASS INDEX: 24.15 KG/M2

## 2021-06-02 NOTE — PROGRESS NOTES
Preoperative Exam    Scheduled Procedure: Yag Capsulotomy of LT eye  Surgery Date:  10/24/19  Surgery Location: [facility], [place], fax [phone]    Surgeon:  Dr Price    Assessment/Plan:     Areli was seen today for establish care and pre-op exam.    Encounter for preoperative examination for general surgical procedure  -     Comprehensive Metabolic Panel  -     HM2(CBC w/o Differential)    Viral URI with cough  Appears well on exam.  Vital signs, normal cardiopulmonary examination.  I do not think chest x-ray is indicated.  Should not interfere with her surgery.    Restless Legs Syndrome  Stable. Needs refill. Will check lab to ensure no iron deficiency attributing to restless leg syndrome.  -     Comprehensive Metabolic Panel  -     Ferritin  -     pramipexole (MIRAPEX) 0.25 MG tablet; TAKE 2 TABLETs BY MOUTH AT BEDTIME 2 HRS BEFORE SYMPTOMS OCCUR (4MG MAX/DAY)    Age-related osteoporosis without current pathological fracture  Last saw the  in 2017.  Getting prolia injection.   -     Vitamin D, Total (25-Hydroxy)    Establishing care with new doctor, encounter for  Reviewed chart. utd with mammogram, colonoscopy. Updated flu shot today. No longer needing pap smear for cervical cancer screen. Gave her advanced directives today.     Other orders  -     Influenza High Dose, Seasonal 65+ yrs        Surgical Procedure Risk: Low (reported cardiac risk generally < 1%)  Have you had prior anesthesia?: Yes  Have you or any family members had a previous anesthesia reaction:  Yes: Pt's Mother had nausea with anesthesia  Do you or any family members have a history of a clotting or bleeding disorder?: No  Cardiac Risk Assessment: no increased risk for major cardiac complications    APPROVAL GIVEN to proceed with proposed procedure, without further diagnostic evaluation      Functional Status: Independent  Patient plans to recover at home with family.     Subjective:      Areli PIERSON  Anahy is a 69 y.o. female who presents for a preoperative consultation.  As well as establishing care with a new physician.  She will be having left eye surgery on 10/24/2019 at [facility].  She has discussed risk and benefit with her ophthalmologist.  She is hoping that her vision will improve after this surgery.  She is worried about a cold that she is been having for the last week or though she feels that her symptoms are improving.  She reports some mild cough runny nose but denies any fever, chills, shortness of breath or chest pain especially on exertion, abdominal pain change in her urinary or bowel habits.  Her  has been in the hospital with pneumonia actually but he's getting better.     All other systems reviewed and are negative, other than those listed in the HPI.    Pertinent History  Do you have difficulty breathing or chest pain after walking up a flight of stairs: No  History of obstructive sleep apnea: No  Steroid use in the last 6 months: No  Frequent Aspirin/NSAID use: No  Prior Blood Transfusion: No  Prior Blood Transfusion Reaction: No  If for some reason prior to, during or after the procedure, if it is medically indicated, would you be willing to have a blood transfusion?:  There is no transfusion refusal.    Current Outpatient Medications   Medication Sig Dispense Refill     acetaminophen (TYLENOL) 325 MG tablet Take 650 mg by mouth as needed for pain.       ascorbic acid (VITAMIN C) 1000 MG tablet Take 500 mg by mouth daily.        azelastine (ASTELIN) 137 mcg (0.1 %) nasal spray 2 sprays each nostril once daily 30 mL 12     CALCIUM ORAL Take 1 tablet by mouth daily.       cetirizine (ZYRTEC) 10 MG tablet Take 10 mg by mouth as needed for allergies.       cholecalciferol, vitamin D3, (VITAMIN D3) 2,000 unit cap Take 3,000 Units by mouth daily.        VIT C/VIT E AC/LUT/COPPER/ZINC (PRESERVISION LUTEIN ORAL) Take 1 capsule by mouth daily.       pramipexole (MIRAPEX) 0.25  MG tablet TAKE 2 TABLETs BY MOUTH AT BEDTIME 2 HRS BEFORE SYMPTOMS OCCUR (4MG MAX/DAY) 180 tablet 1     No current facility-administered medications for this visit.         Allergies   Allergen Reactions     Erythromycin Base      upset stomach       Midazolam      Annotation: severe restless leg symptoms         Patient Active Problem List   Diagnosis     Rectal Polyps     Restless Legs Syndrome     Environmental allergies     Osteoporosis     Chronic Sinusitis     Hearing loss     Lumbago     Bilateral sensorineural hearing loss       Past Medical History:   Diagnosis Date     Osteoporosis      Restless legs syndrome (RLS)        Past Surgical History:   Procedure Laterality Date     FL BIOPSY OF BREAST, INCISIONAL      Description: Biopsy Breast Open;  Proc Date: 09/01/2003;     FL HEMORRHOIDECTOMY INTERNAL RUBBER BAND LIGATIONS      Description: Hemorrhoidectomy;  Proc Date: 06/01/1999;     FL KESSLER W/O FACETEC FORAMOT/DSKC 1/2 VRT SEG, LUMBAR      Description: Laminectomy Decompressive Up To Two Lumbar Segments;  Recorded: 04/29/2008;  Comments: L4-5     FL OPEN TX PHALANGEAL SHAFT FRACTURE PROX/MIDDLE EA      Description: Open Treatment Of Each Fracture Of Proximal Finger Phalanx;  Recorded: 04/29/2008;  Comments: R 5th finger w/ pinning     FL PARTIAL REMOVAL OF HYMEN      Description: Partial Hymenectomy;  Recorded: 04/29/2008;     FL REMOVE TOTAL DISC ARTHROPLASTY, LUMBAR, SINGLE      Description: Total Disc Arthroplasty Removal Lumbar Single Interspace;  Recorded: 04/29/2008;  Comments: L5       Social History     Socioeconomic History     Marital status:      Spouse name: Not on file     Number of children: Not on file     Years of education: Not on file     Highest education level: Not on file   Occupational History     Not on file   Social Needs     Financial resource strain: Not on file     Food insecurity:     Worry: Not on file     Inability: Not on file     Transportation needs:     Medical:  "Not on file     Non-medical: Not on file   Tobacco Use     Smoking status: Never Smoker     Smokeless tobacco: Never Used   Substance and Sexual Activity     Alcohol use: Yes     Comment: 2 drinks/week     Drug use: No     Sexual activity: Yes     Partners: Male     Birth control/protection: None   Lifestyle     Physical activity:     Days per week: Not on file     Minutes per session: Not on file     Stress: Not on file   Relationships     Social connections:     Talks on phone: Not on file     Gets together: Not on file     Attends [employer] service: Not on file     Active member of club or organization: Not on file     Attends meetings of clubs or organizations: Not on file     Relationship status: Not on file     Intimate partner violence:     Fear of current or ex partner: Not on file     Emotionally abused: Not on file     Physically abused: Not on file     Forced sexual activity: Not on file   Other Topics Concern     Not on file   Social History Narrative     Not on file       Patient Care Team:  Leelee Butcher MD as PCP - General (Family Medicine)  Fabricio Miller MD as Physician (Internal Medicine)  Leelee Butcher MD as Assigned PCP          Objective:     Vitals:    10/17/19 1353   BP: 127/60   Pulse: 78   Resp: 16   Temp: 98.9  F (37.2  C)   TempSrc: Oral   SpO2: 96%   Weight: 136 lb 6.4 oz (61.9 kg)   Height: 5' 2.4\" (1.585 m)         Physical Exam:  General Appearance: Alert, cooperative, no distress, appears stated age, obese  Head: Normocephalic, without obvious abnormality, atraumatic  Eyes: PERRL, conjunctiva/corneas clear, EOM's intact  Ears: Normal TM's and external ear canals, both ears  Nose: Nares normal, septum midline,mucosa normal, no drainage  Throat: Lips, mucosa, and tongue normal; teeth and gums normal  Neck: Supple, symmetrical, trachea midline, no adenopathy;  thyroid: not enlarged, symmetric, no tenderness/mass/nodules; no carotid bruit or JVD  Back: Symmetric, no curvature, " ROM normal  Lungs: Clear to auscultation bilaterally, respirations unlabored  Heart: Regular rate and rhythm, S1 and S2 normal, no murmur rub or gallop  Abdomen: Soft, non-tender, bowel sounds active all four quadrants,  no masses, no organomegaly  Genitourinary: Not performed  Musculoskeletal: Normal range of motion.    Extremities: Extremities normal, atraumatic, no cyanosis,   Skin: Limited skin examination is unremarkable  Lymph nodes: Cervical, supraclavicular, and axillary nodes normal  Neurologic: alert, has normal reflexes.  answers questions appropriately, sensation intact throughout.   Psychiatric: normal mood and affect.     Patient Instructions     Hold all supplements, aspirin and NSAIDs for 7 days prior to surgery.  Follow your surgeon's direction on when to stop eating and drinking prior to surgery.  Your surgeon will be managing your pain after your surgery.        EKG: Not indicated    Labs:  Recent Results (from the past 120 hour(s))   Comprehensive Metabolic Panel    Collection Time: 10/17/19  2:45 PM   Result Value Ref Range    Sodium 140 136 - 145 mmol/L    Potassium 4.4 3.5 - 5.0 mmol/L    Chloride 102 98 - 107 mmol/L    CO2 26 22 - 31 mmol/L    Anion Gap, Calculation 12 5 - 18 mmol/L    Glucose 102 70 - 125 mg/dL    BUN 14 8 - 22 mg/dL    Creatinine 0.67 0.60 - 1.10 mg/dL    GFR MDRD Af Amer >60 >60 mL/min/1.73m2    GFR MDRD Non Af Amer >60 >60 mL/min/1.73m2    Bilirubin, Total 0.3 0.0 - 1.0 mg/dL    Calcium 9.7 8.5 - 10.5 mg/dL    Protein, Total 6.7 6.0 - 8.0 g/dL    Albumin 4.0 3.5 - 5.0 g/dL    Alkaline Phosphatase 102 45 - 120 U/L    AST 17 0 - 40 U/L    ALT 18 0 - 45 U/L   HM2(CBC w/o Differential)    Collection Time: 10/17/19  2:45 PM   Result Value Ref Range    WBC 8.7 4.0 - 11.0 thou/uL    RBC 4.70 3.80 - 5.40 mill/uL    Hemoglobin 14.3 12.0 - 16.0 g/dL    Hematocrit 42.3 35.0 - 47.0 %    MCV 90 80 - 100 fL    MCH 30.4 27.0 - 34.0 pg    MCHC 33.7 32.0 - 36.0 g/dL    RDW 11.2 11.0 -  14.5 %    Platelets 249 140 - 440 thou/uL    MPV 7.6 7.0 - 10.0 fL   Vitamin D, Total (25-Hydroxy)    Collection Time: 10/17/19  2:45 PM   Result Value Ref Range    Vitamin D, Total (25-Hydroxy) 38.1 30.0 - 80.0 ng/mL   Ferritin    Collection Time: 10/17/19  2:45 PM   Result Value Ref Range    Ferritin 88 10 - 130 ng/mL       Immunization History   Administered Date(s) Administered     DT (pediatric) 05/06/2003     Hep A, historic 04/29/2008, 04/27/2009     Influenza high dose,seasonal,PF, 65+ yrs 10/20/2015, 11/02/2016, 09/29/2017, 10/23/2018     Influenza, seasonal,quad inj 6-35 mos 11/05/2008, 09/14/2009, 10/25/2010, 11/20/2012, 10/17/2013, 10/22/2014     Pneumo Conj 13-V (2010&after) 10/20/2015     Pneumo Polysac 23-V 11/02/2016     Td,adult,historic,unspecified 05/06/2003     Tdap 05/04/2012     ZOSTER, LIVE 12/19/2012         Electronically signed by Leelee Butcher MD 10/17/19 1:57 PM

## 2021-06-03 VITALS — HEIGHT: 62 IN | WEIGHT: 134.6 LBS | BODY MASS INDEX: 24.77 KG/M2

## 2021-06-03 VITALS
BODY MASS INDEX: 25.1 KG/M2 | WEIGHT: 136.4 LBS | SYSTOLIC BLOOD PRESSURE: 127 MMHG | RESPIRATION RATE: 16 BRPM | DIASTOLIC BLOOD PRESSURE: 60 MMHG | OXYGEN SATURATION: 96 % | HEIGHT: 62 IN | HEART RATE: 78 BPM | TEMPERATURE: 98.9 F

## 2021-06-03 NOTE — PATIENT INSTRUCTIONS - HE
Prolia 13th when approved by your insurance. We will call you when approved and you can schedule visit at [facility].  Prolia 14th in 6 months with my nurse. I will see you in 1 year.    DXA in 2 years .   Phone number to schedule [phone].    Daily calcium need is 9006-2678 mg a day from the diet and supplements.  Calcium citrate is easier to digest.  Vitamin D 3000 IU daily recommended.

## 2021-06-03 NOTE — PROGRESS NOTES
"Prolia Injection Phone Screen      Screening questions have been asked 2-3 days prior to administration visit for Prolia. If any questions are answered with \"Yes,\" this phone encounter were will routed to ordering provider for further evaluation.     1.  When was the last injection?  10/30/18    2.  Has insurance for this injection been verified?  Yes    3.  Did you experience any new onset achiness or rashes that lasted for over a month with your previous Prolia injection?   No    4.  Do you have a fever over 101?F or a new deep cough that is unusual for you today? No    5.  Have you started any new medications in the last 6 months that you were told could affect your immune system? These may have been prescribed by oncologist, transplant, rheumatology, or dermatology.   No    6.  In the last 6 months have you have gastric bypass or parathyroid surgery?   No    7.  Do you plan dental work requiring drilling into the bone such as implants/extractions or oral surgery in the next 2-3 months?   No    8. Do you have new insurance since the last injection? No.     Patient informed if symptoms discussed above present prior to their administration appointment, they are to notify clinic immediately.     Isha Wang            "

## 2021-06-03 NOTE — PROGRESS NOTES
1. Age-related osteoporosis without current pathological fracture  denosumab 60 mg (PROLIA 60 mg/ml)    denosumab 60 mg (PROLIA 60 mg/ml)   2. Personal history of other drug therapy  denosumab 60 mg (PROLIA 60 mg/ml)    denosumab 60 mg (PROLIA 60 mg/ml)     Patient was educated on safety of Prolia utilizing Patient Counseling Chart for Healthcare Providers, as outlined by the Prolia REMS progam.     Return in about 6 months (around 5/20/2020) for Recheck, Prolia injection.    Patient Instructions   Prolia 13th when approved by your insurance. We will call you when approved and you can schedule visit at [facility].  Prolia 14th in 6 months with my nurse. I will see you in 1 year.    DXA in 2 years .   Phone number to schedule [phone].    Daily calcium need is 9266-9449 mg a day from the diet and supplements.  Calcium citrate is easier to digest.  Vitamin D 3000 IU daily recommended.      Chief Complaint   Patient presents with     Osteoporosis Follow Up       /60   Pulse 62       Did you experience any problems with previous Prolia injection? no  Any medication change in the last 6 months? no  Did you take prednisone or other immunosupressant drugs in the last 6 months   (chemo, transplant, rheum, dermatology conditions)? no  Did you have any serious infection in the last 6 months?no  Any recent hospitalizations?no  Do you plan any dental work in the next 2-3 months?no  How much calcium do you take daily from the diet and supplements?1200 mg  How much vit D do you take daily? 3000 IU  Last DXA? Done today,  Reviewed and discussed      Patient is here today for the 13th Prolia injection. Patient tolerated previous injections well.   We discussed calcium and vit D daily needs today.   Next Prolia injection will be in 6 months.     15 minutes spent with the patient and more then 50 % of the time in counseling.  This note has been dictated using voice recognition software. Any grammatical or context  distortions are unintentional and inherent to the software      Patient Active Problem List   Diagnosis     Rectal Polyps     Restless Legs Syndrome     Environmental allergies     Osteoporosis     Chronic Sinusitis     Hearing loss     Lumbago     Bilateral sensorineural hearing loss     Personal history of other drug therapy       Current Outpatient Medications on File Prior to Visit   Medication Sig Dispense Refill     acetaminophen (TYLENOL) 325 MG tablet Take 650 mg by mouth as needed for pain.       ascorbic acid (VITAMIN C) 1000 MG tablet Take 500 mg by mouth daily.        azelastine (ASTELIN) 137 mcg (0.1 %) nasal spray 2 sprays each nostril once daily 30 mL 12     CALCIUM ORAL Take 1 tablet by mouth daily.       cetirizine (ZYRTEC) 10 MG tablet Take 10 mg by mouth as needed for allergies.       cholecalciferol, vitamin D3, (VITAMIN D3) 2,000 unit cap Take 3,000 Units by mouth daily.        pramipexole (MIRAPEX) 0.25 MG tablet TAKE 2 TABLETs BY MOUTH AT BEDTIME 2 HRS BEFORE SYMPTOMS OCCUR (4MG MAX/DAY) 180 tablet 1     VIT C/VIT E AC/LUT/COPPER/ZINC (PRESERVISION LUTEIN ORAL) Take 1 capsule by mouth daily.       No current facility-administered medications on file prior to visit.

## 2021-06-04 VITALS
DIASTOLIC BLOOD PRESSURE: 67 MMHG | HEART RATE: 66 BPM | BODY MASS INDEX: 24.4 KG/M2 | WEIGHT: 132.6 LBS | SYSTOLIC BLOOD PRESSURE: 140 MMHG | OXYGEN SATURATION: 97 % | HEIGHT: 62 IN

## 2021-06-04 VITALS
HEART RATE: 71 BPM | RESPIRATION RATE: 16 BRPM | BODY MASS INDEX: 24.81 KG/M2 | WEIGHT: 137.4 LBS | OXYGEN SATURATION: 96 % | SYSTOLIC BLOOD PRESSURE: 137 MMHG | DIASTOLIC BLOOD PRESSURE: 56 MMHG

## 2021-06-05 VITALS
OXYGEN SATURATION: 99 % | DIASTOLIC BLOOD PRESSURE: 70 MMHG | BODY MASS INDEX: 24.91 KG/M2 | WEIGHT: 136.2 LBS | HEART RATE: 66 BPM | RESPIRATION RATE: 16 BRPM | SYSTOLIC BLOOD PRESSURE: 144 MMHG

## 2021-06-05 NOTE — PROGRESS NOTES
ASSESSMENT/ PLAN      Areli was seen today for mass.    Ganglion cyst of wrist, left  History and exam consistent with ganglion cyst of the left wrist.  Discussed conservative management versus seeing hand surgeon.  Patient would like to go see a hand surgeon for further evaluation and potential surgery.  Referral placed.  No other concerning features.  No cellulitis appreciated.  No falls I do not think x-ray is needed.  -     Ambulatory referral to Orthopedics    Restless Legs Syndrome  She is up-to-date with her lab work.  This is helping her with restless leg syndrome.  Will refill for.  Return in 6 months for annual wellness visit and fasting lab work.  -     pramipexole (MIRAPEX) 0.25 MG tablet; TAKE 2 TABLETs BY MOUTH AT BEDTIME 2 HRS BEFORE SYMPTOMS OCCUR (4MG MAX/DAY)          This note was created using Dragon dictation software, spelling errors may occur.     Leelee Butcher MD        SUBJECTIVE   Areli Higginbotham is a 69 y.o. old female who presented to clinic today for further evaluation of wrist lump on left wrist. Has been there for about a month. It was larger at first and has become smaller since.  She also has a history of restless leg syndrome and would like refill of Mirapex though today.  She denies any fall on her left wrist.  Denies any pain.  No history of arthritis.    Review of Systems:  Negative except as noted in HPI      The following portions of the patient's history were reviewed and updated as appropriate: past medical history, past surgical history, family history, allergies, current medications and problem list.    Medical History  Active Ambulatory (Non-Hospital) Problems    Diagnosis     Personal history of other drug therapy     Bilateral sensorineural hearing loss     Lumbago     Rectal Polyps     Restless Legs Syndrome     Environmental allergies     Osteoporosis     Chronic Sinusitis     Hearing loss     Past Medical History:   Diagnosis Date     Osteoporosis       Restless legs syndrome (RLS)        Surgical History  She  has a past surgical history that includes pr biopsy of breast, incisional; pr kendrick w/o facetec foramot/dskc 1/2 vrt seg, lumbar; pr remove total disc arthroplasty, lumbar, single; pr hemorrhoidectomy internal rubber band ligations; pr partial removal of hymen; and pr open tx phalangeal shaft fracture prox/middle ea.    Social History  Reviewed, and she  reports that she has never smoked. She has never used smokeless tobacco. She reports current alcohol use. She reports that she does not use drugs.    Family History  Reviewed, and family history includes Cancer in her mother; Heart disease in her father; Hypertension in her mother; Osteoporosis in her mother.    Medications  Reviewed and reconciled    Allergies  Allergies   Allergen Reactions     Erythromycin Base      upset stomach       Midazolam      Annotation: severe restless leg symptoms           OBJECTIVE  Physical Exam:  Vital signs: /56 (Patient Site: Left Arm, Patient Position: Sitting, Cuff Size: Adult Regular)   Pulse 71   Resp 16   Wt 137 lb 6.4 oz (62.3 kg)   SpO2 96%   BMI 24.81 kg/m    Weight: 137 lb 6.4 oz (62.3 kg)    General appearance: pleasant, appears stated age, cooperative and in no distress  Musculoskeletal: ganglion cyst left medial aspect of wrist, nontender to palpation, mobile.   Skin: no rashes  Neuro: alert oriented x 3, grossly normal otherwise  Psych: normal affect, appropriate conversation

## 2021-06-07 NOTE — PROGRESS NOTES
"Areli Higginbotham is a 69 y.o. female who is being evaluated via a billable telephone visit.      The patient has been notified of following:     \"This telephone visit will be conducted via a call between you and your physician/provider. We have found that certain health care needs can be provided without the need for a physical exam.  This service lets us provide the care you need with a short phone conversation.  If a prescription is necessary we can send it directly to your pharmacy.  If lab work is needed we can place an order for that and you can then stop by our lab to have the test done at a later time.    Telephone visits are billed at different rates depending on your insurance coverage. During this emergency period, for some insurers they may be billed the same as an in-person visit.  Please reach out to your insurance provider with any questions.    If during the course of the call the physician/provider feels a telephone visit is not appropriate, you will not be charged for this service.\"    Patient has given verbal consent to a Telephone visit? Yes    Patient would like to receive their AVS by AVS Preference: Romulo.    Areli Higginbotham complains of  No chief complaint on file.      I have reviewed and updated the patient's Past Medical History, Social History, Family History and Medication List.    ALLERGIES  Erythromycin base and Midazolam    Additional provider notes:       ASSESSMENT/ PLAN    Areli was called today for abdominal pain.    Abdominal pain, epigastric  ddx is broad. Due to this a telephone visit, hard to be for sure what her pain is from but I'm guessing GERD, MSK (tied to her low back pain and her report). Can't do lab or imaging at this point due to COVID. Will empirically put her on omeprazole 20 mg daily for 2-4 weeks and hopefully pain will go away. If not better, patient will update me and we will go from there. She sounded good on the phone. Pain is just irritating to her " but doesn't stop her from walking 2.5 miles a day or doing her exercise videos.   -     omeprazole (PRILOSEC) 20 MG capsule; Take 1 capsule (20 mg total) by mouth daily before breakfast.          This note was created using Dragon dictation software, spelling errors may occur.     Leelee Butcher MD        SUBJECTIVE   Areli Higginbotham is a 69 y.o. old female who I'm calling today for further evaluation of ongoing abdominal pain and lower back. Started 3 days ago. Patient reports to my assistant that she's feeling better today but pain is still ongoing, maybe less in severity but is still uncomfortable. Pain located in the middle of her abdomen above button. Pain doesn't radiate. Pain is an achy feeling. Pain feels like muscle. Feels more gassy or flatus which has now almost gone. She feels that she has done 100 sit ups. It's irritating and frustrating and she just wants it to go away. Denies fever, nausea/vomiting. Lower back is much better today, still hurts a little bit when she sits up in chair or gets up. She started using Activia probiotics which helped a little bit. She does exercise tape in the morning and goes for walks (2.5 mile a day). She has always been very active. Did have chills for 1 day but have resolved. Reports alternating loose stool and constipation. activia helped with loose stool. Feels like she burps more. These symptoms are not exacerbated with eating. Denies urinary symptoms, denies abnormal vaginal discharge. No previous similar symptoms. Has had low back issues off and on for years (back surgery in her 40's). She denies chest pain on exertion, shortness of breath, cough, respiratory symptoms. She actually feels better if she walks outside. She's wondering if this is all related to stress with COVID-19 and quarantined life.     Review of Systems:  Negative except as noted in HPI    The following portions of the patient's history were reviewed and updated as appropriate: past medical  history, past surgical history, family history, allergies, current medications and problem list.    Medical History  Active Ambulatory (Non-Hospital) Problems    Diagnosis     Personal history of other drug therapy     Bilateral sensorineural hearing loss     Lumbago     Rectal Polyps     Restless Legs Syndrome     Environmental allergies     Osteoporosis     Chronic Sinusitis     Hearing loss     Past Medical History:   Diagnosis Date     Osteoporosis      Restless legs syndrome (RLS)        Surgical History  She  has a past surgical history that includes pr biopsy of breast, incisional; pr kendrick w/o facetec foramot/dskc 1/2 vrt seg, lumbar; pr remove total disc arthroplasty, lumbar, single; pr hemorrhoidectomy internal rubber band ligations; pr partial removal of hymen; and pr open tx phalangeal shaft fracture prox/middle ea.    Social History  Reviewed, and she  reports that she has never smoked. She has never used smokeless tobacco. She reports current alcohol use. She reports that she does not use drugs.    Family History  Reviewed, and family history includes Cancer in her mother; Heart disease in her father; Hypertension in her mother; Osteoporosis in her mother.    Medications  Reviewed and reconciled    Allergies  Allergies   Allergen Reactions     Erythromycin Base      upset stomach       Midazolam      Annotation: severe restless leg symptoms           OBJECTIVE  Physical Exam:  Not performed, telephone visit         Phone call duration:  18 minutes    Leticia Martinez, CMA

## 2021-06-07 NOTE — TELEPHONE ENCOUNTER
Refill Approved    Rx renewed per Medication Renewal Policy. Medication was last renewed on 4/10/20.    Rosetta Loya, [facility] Triage/Med Refill 5/4/2020     Requested Prescriptions   Pending Prescriptions Disp Refills     omeprazole (PRILOSEC) 20 MG capsule [Pharmacy Med Name: OMEPRAZOLE DR 20 MG CAPSULE] 30 capsule 0     Sig: TAKE 1 CAPSULE (20 MG TOTAL) BY MOUTH DAILY BEFORE BREAKFAST.       GI Medications Refill Protocol Passed - 5/2/2020 12:35 PM        Passed - PCP or prescribing provider visit in last 12 or next 3 months.     Last office visit with prescriber/PCP: 1/17/2020 Leelee Butcher MD OR same dept: 1/17/2020 Leelee Butcher MD OR same specialty: 1/17/2020 Leelee Butcher MD  Last physical: Visit date not found Last MTM visit: Visit date not found   Next visit within 3 mo: Visit date not found  Next physical within 3 mo: Visit date not found  Prescriber OR PCP: Leelee Butcher MD  Last diagnosis associated with med order: 1. Abdominal pain, epigastric  - omeprazole (PRILOSEC) 20 MG capsule [Pharmacy Med Name: OMEPRAZOLE DR 20 MG CAPSULE]; Take 1 capsule (20 mg total) by mouth daily before breakfast.  Dispense: 30 capsule; Refill: 0    If protocol passes may refill for 12 months if within 3 months of last provider visit (or a total of 15 months).

## 2021-06-08 NOTE — TELEPHONE ENCOUNTER
Requested Prescriptions     Pending Prescriptions Disp Refills     azelastine (ASTELIN) 137 mcg (0.1 %) nasal spray 30 mL 12     Si sprays each nostril once daily

## 2021-06-08 NOTE — TELEPHONE ENCOUNTER
Refill Approved    Rx renewed per Medication Renewal Policy. Medication was last renewed on 19.    Rosetta Loya, [facility] Triage/Med Refill 2020     Requested Prescriptions   Pending Prescriptions Disp Refills     azelastine (ASTELIN) 137 mcg (0.1 %) nasal spray 30 mL 12     Si sprays each nostril once daily       Nasal Spray Protocol Passed - 2020 12:00 PM        Passed - Patient has had office visit/physical in last 1 year     Last office visit with prescriber/PCP: 2020 Leelee Butcher MD OR same dept: 2020 Leelee Butcher MD OR same specialty: 2020 Leelee Butcher MD Last physical: Visit date not found Last MTM visit: Visit date not found    Next visit within 3 mo: Visit date not found  Next physical within 3 mo: Visit date not found  Prescriber OR PCP: Leelee Butcher MD  Last diagnosis associated with med order: 1. Chronic sinusitis, unspecified location  - azelastine (ASTELIN) 137 mcg (0.1 %) nasal spray; 2 sprays each nostril once daily  Dispense: 30 mL; Refill: 12    2. Environmental allergies  - azelastine (ASTELIN) 137 mcg (0.1 %) nasal spray; 2 sprays each nostril once daily  Dispense: 30 mL; Refill: 12

## 2021-06-08 NOTE — PROGRESS NOTES
"Prolia Injection Phone Screen      Screening questions have been asked 2-3 days prior to administration visit for Prolia. If any questions are answered with \"Yes,\" this phone encounter were will routed to ordering provider for further evaluation.     1.  When was the last injection?  11/20/2019    2.  Has insurance for this injection been verified?  Yes    3.  Did you experience any new onset achiness or rashes that lasted for over a month with your previous Prolia injection?   No    4.  Do you have a fever over 101?F or a new deep cough that is unusual for you today? No    5.  Have you started any new medications in the last 6 months that you were told could affect your immune system? These may have been prescribed by oncologist, transplant, rheumatology, or dermatology.   No    6.  In the last 6 months have you have gastric bypass or parathyroid surgery?   No    7.  Do you plan dental work requiring drilling into the bone such as implants/extractions or oral surgery in the next 2-3 months?   No    8. Do you have new insurance since the last injection? nO  Patient informed if symptoms discussed above present prior to their administration appointment, they are to notify clinic immediately.     Jane Loredo        The following steps were completed to comply with the REMS program for Prolia:  1. Ordering provider has previously reviewed information in the Medication Guide and Patient Counseling Chart, including the serious risks of Prolia  and the symptoms of each risk and have been advised to seek prompt medical attention if they have signs or symptoms of any of the serious risks.  2. Provided each patient a copy of the Medication Guide and Patient Brochure.  See MAR for administration details.   Indication: Prolia  (denosumab) is a prescription medicine used to treat osteoporosis in patients who:   Are at high risk for fracture, meaning patients who have had a fracture related to osteoporosis, or who have " multiple risk factors for fracture; Cannot use another osteoporosis medicine or other osteoporosis medicines did not work well.   The timeline for early/late injections would be 4 weeks early and any time after the 6 month heladio. If a patient receives their injection late, then the subsequent injection would be 6 months from the date that they actually received the injection    Have the screening questions been asked prior to this administration? Yes       PT STATES SHE WILL CALL AND MAKE HER 6MO FOLLOW UP

## 2021-06-09 NOTE — PROGRESS NOTES
Assessment and Plan:     Areli was seen today for annual wellness visit.    Encounter for general adult medical examination with abnormal findings  Routine history and physical, updated in EMR.  Patient is up-to-date with mammogram and colonoscopy which was done in 2017 and repeat in 5 years.  She sees endocrinology for osteoporosis and is on Prolia injection.  She is up-to-date with immunization.  Return in a year for next annual physical and fasting lab work.  -     Lipid [place] FASTING; Future  -     Comprehensive Metabolic Panel; Future    Restless Legs Syndrome  Is been taking 2 tablets consistently at nighttime and is working well for her.  Previous lab work has been unremarkable.  -     pramipexole (MIRAPEX) 0.25 MG tablet; TAKE 2 TABLETs BY MOUTH 2-3 HOURS BEFORE BEDTIME. MAX 0.75 MG DAILY    Age-related osteoporosis without current pathological fracture  Sees endocrinology.  On Prolia injection.    Rectal Polyps  Up-to-date with colonoscopy.    Abdominal pain, epigastric  Had a virtual telephone visit with me in March 2020 and I scribed her omeprazole and her epigastric abdominal pain just went away.  She thinks it was secondary to stress and anxiety and not really acid reflux symptoms.  She is wondering if she can stop taking omeprazole altogether and stay on Activia instead. If pain recurs, she can restart omeprazole or can consider upper EGD      The patient's current medical problems were reviewed.    I have had an Advance Directives discussion with the patient.  The following health maintenance schedule was reviewed with the patient and provided in printed form in the after visit summary:   Health Maintenance   Topic Date Due     ZOSTER VACCINES (2 of 3) 02/13/2013     MEDICARE ANNUAL WELLNESS VISIT  05/28/2020     INFLUENZA VACCINE RULE BASED (1) 08/01/2020     FALL RISK ASSESSMENT  04/10/2021     DXA SCAN  11/20/2021     MAMMOGRAM  12/10/2021     TD 18+ HE  05/04/2022     LIPID  05/21/2023      ADVANCE CARE PLANNING  05/28/2024     COLORECTAL CANCER SCREENING  09/08/2027     HEPATITIS C SCREENING  Completed     PNEUMOCOCCAL IMMUNIZATION 65+ LOW/MEDIUM RISK  Completed        Subjective:   Chief Complaint: Areli Higginbotham is an 69 y.o. female here for an Annual Wellness visit.   HPI:    Abdominal pain is gone. Had virtual visit with me in 3/2019 and I didn't know what it was and I thought it was acid reflux and started her omeprazole and pain resolved. Otherwise she feels well.  She that her epigastric abdominal pain was secondary to stress and anxiety.  She would like to get off of omeprazole if she could. She only wants to take Activia for her symptoms.     Review of Systems:   Please see above.  The rest of the review of systems are negative for all systems.    Patient Care Team:  Leelee Butcher MD as PCP - General (Family Medicine)  Fabricio Miller MD as Physician (Internal Medicine)  Leelee Butcher MD as Assigned PCP     Patient Active Problem List   Diagnosis     Rectal Polyps     Restless Legs Syndrome     Environmental allergies     Osteoporosis     Chronic Sinusitis     Hearing loss     Lumbago     Bilateral sensorineural hearing loss     Personal history of other drug therapy     Past Medical History:   Diagnosis Date     Osteoporosis      Restless legs syndrome (RLS)       Past Surgical History:   Procedure Laterality Date     TX BIOPSY OF BREAST, INCISIONAL      Description: Biopsy Breast Open;  Proc Date: 09/01/2003;     TX HEMORRHOIDECTOMY INTERNAL RUBBER BAND LIGATIONS      Description: Hemorrhoidectomy;  Proc Date: 06/01/1999;     TX KESSLER W/O FACETEC FORAMOT/DSKC 1/2 VRT SEG, LUMBAR      Description: Laminectomy Decompressive Up To Two Lumbar Segments;  Recorded: 04/29/2008;  Comments: L4-5     TX OPEN TX PHALANGEAL SHAFT FRACTURE PROX/MIDDLE EA      Description: Open Treatment Of Each Fracture Of Proximal Finger Phalanx;  Recorded: 04/29/2008;  Comments: R 5th finger w/ pinning      LA PARTIAL REMOVAL OF HYMEN      Description: Partial Hymenectomy;  Recorded: 04/29/2008;     LA REMOVE TOTAL DISC ARTHROPLASTY, LUMBAR, SINGLE      Description: Total Disc Arthroplasty Removal Lumbar Single Interspace;  Recorded: 04/29/2008;  Comments: L5      Family History   Problem Relation Age of Onset     Hypertension Mother      Cancer Mother         ovarian     Osteoporosis Mother      Heart disease Father       Social History     Socioeconomic History     Marital status:      Spouse name: Not on file     Number of children: Not on file     Years of education: Not on file     Highest education level: Not on file   Occupational History     Not on file   Social Needs     Financial resource strain: Not on file     Food insecurity     Worry: Not on file     Inability: Not on file     Transportation needs     Medical: Not on file     Non-medical: Not on file   Tobacco Use     Smoking status: Never Smoker     Smokeless tobacco: Never Used   Substance and Sexual Activity     Alcohol use: Yes     Comment: 2 drinks/week     Drug use: No     Sexual activity: Yes     Partners: Male     Birth control/protection: None   Lifestyle     Physical activity     Days per week: Not on file     Minutes per session: Not on file     Stress: Not on file   Relationships     Social connections     Talks on phone: Not on file     Gets together: Not on file     Attends Anabaptism service: Not on file     Active member of club or organization: Not on file     Attends meetings of clubs or organizations: Not on file     Relationship status: Not on file     Intimate partner violence     Fear of current or ex partner: Not on file     Emotionally abused: Not on file     Physically abused: Not on file     Forced sexual activity: Not on file   Other Topics Concern     Not on file   Social History Narrative     Not on file      Current Outpatient Medications   Medication Sig Dispense Refill     acetaminophen (TYLENOL) 325 MG tablet Take  "650 mg by mouth as needed for pain.       ascorbic acid (VITAMIN C) 1000 MG tablet Take 500 mg by mouth daily.        azelastine (ASTELIN) 137 mcg (0.1 %) nasal spray 2 sprays each nostril once daily 30 mL 12     CALCIUM ORAL Take 1 tablet by mouth daily.       cetirizine (ZYRTEC) 10 MG tablet Take 10 mg by mouth as needed for allergies.       cholecalciferol, vitamin D3, (VITAMIN D3) 2,000 unit cap Take 3,000 Units by mouth daily.        VIT C/VIT E AC/LUT/COPPER/ZINC (PRESERVISION LUTEIN ORAL) Take 1 capsule by mouth daily.       pramipexole (MIRAPEX) 0.25 MG tablet TAKE 2 TABLETs BY MOUTH 2-3 HOURS BEFORE BEDTIME. MAX 0.75 MG DAILY 180 tablet 1     Current Facility-Administered Medications   Medication Dose Route Frequency Provider Last Rate Last Dose     denosumab 60 mg (PROLIA 60 mg/ml)  60 mg Subcutaneous Q6 Months Fabricio Miller MD   60 mg at 05/29/20 0923     denosumab 60 mg (PROLIA 60 mg/ml)  60 mg Subcutaneous Once Fabricio Miller MD          Objective:   Vital Signs:   Visit Vitals  /69 (Patient Site: Left Arm, Patient Position: Sitting, Cuff Size: Adult Regular)   Pulse 71   Ht 5' 2\" (1.575 m)   Wt 132 lb 9.6 oz (60.1 kg)   SpO2 97%   BMI 24.25 kg/m           VisionScreening:  No exam data present     PHYSICAL EXAM  /69 (Patient Site: Left Arm, Patient Position: Sitting, Cuff Size: Adult Regular)   Pulse 71   Ht 5' 2\" (1.575 m)   Wt 132 lb 9.6 oz (60.1 kg)   SpO2 97%   BMI 24.25 kg/m      General Appearance:    Alert, cooperative, no distress, appears stated age   Head:    Normocephalic, without obvious abnormality, atraumatic   Eyes:    PERRL, conjunctiva/corneas clear, EOM's intact both eyes   Ears:   not examined   Nose:  not examined   Throat:  not examined.    Neck:   Supple, symmetrical, trachea midline, no adenopathy;     thyroid:  no enlargement/tenderness/nodules   Back:     Symmetric, no curvature, ROM normal, no CVA tenderness   Lungs:     Clear to auscultation bilaterally, " respirations unlabored   Chest Wall:    No tenderness or deformity    Heart:    Regular rate and rhythm, S1 and S2 normal, no murmur, rub    or gallop   Breast Exam:    Not examined    Abdomen:     Soft, non-tender, bowel sounds active all four quadrants,     no masses, no organomegaly   Genitalia:  Not examined.    Rectal:     Extremities:   Extremities normal, atraumatic, no cyanosis or edema       Skin:   Skin color, texture, turgor normal, no rashes or lesions   Lymph nodes:   Cervical, supraclavicular nodes normal   Neurologic:   CNII-XII intact, normal strength, sensation and reflexes     throughout         Assessment Results 7/13/2020   Activities of Daily Living No help needed   Instrumental Activities of Daily Living No help needed   Mini Cog Total Score 5   Some recent data might be hidden     A Mini-Cog score of 0-2 suggests the possibility of dementia, score of 3-5 suggests no dementia    Identified Health Risks:     The patient was provided with written information regarding signs of hearing loss.  The patient identified a concern for her hearing.  I referred her to [facility].  Patient's advanced directive was discussed and I am comfortable with the patient's wishes.

## 2021-06-10 NOTE — PROGRESS NOTES
Assessment/Plan:    1. Health care maintenance  Immunizations reviewed and utd  Colonosocopy reviewed--- , repeat   Mammography reviewed normal   Pap reviewed normal   Routine Dental and Eye care recommended  Discussed importance of regular exercise and appropriate calcium intake    2. Restless Legs Syndrome  Well-controlled with medication.  Rx renewed today at patient request.  - pramipexole (MIRAPEX) 0.25 MG tablet; TAKE 1-2 TABLET BY MOUTH AT BEDTIME 2 HRS BEFORE SYMPTOMS OCCUR (4MG MAX/DAY)  Dispense: 180 tablet; Refill: 3    3. Osteoporosis  Recommend calcium intake (12-1500mg/day) with vitamin D (0308-5941 IU daily) as well as weight bearing exercise to include strength and balance.  Calcium supplement should be calcium CITRATE  Patient is on Prolia injections every 6 months.  She is due for follow-up with Dr. Miller after a bone density in .    4. Allergies  Controlled with daily Zyrtec.  Seasonally, she will add in Nasonex.    Pt states an understanding and agrees with the above plan.                Health Maintenance   Topic Date Due     FALL RISK ASSESSMENT  2016     COLONOSCOPY  2017     MAMMOGRAM  2017     DXA SCAN  2018     ADVANCE DIRECTIVES DISCUSSED WITH PATIENT  2020     TD 18+ HE  2022     PNEUMOCOCCAL POLYSACCHARIDE VACCINE AGE 65 AND OVER  Completed     INFLUENZA VACCINE RULE BASED  Completed     PNEUMOCOCCAL CONJUGATE VACCINE FOR ADULTS (PCV13 OR PREVNAR)  Completed     ZOSTER VACCINE  Completed         HPI    Patient is a 66 y.o. female presents for a physical exam.  Patient reports she is doing quite well.  Her mom  last month but she is coping well with this---some stressors from estate management.  Areli remains active and socially involved.  She denies chest pain, shortness of breath, lower extremity edema, headaches, vision changes, bowel changes.  She has a history of restless legs and is responded well to use of low-dose  Mirapex.  She is requesting refill of the medication.  She continues to use Zyrtec on a daily basis and seasonal Nasonex for control of allergies with good control of symptoms.  She did have some concerns after reading about link between Nasonex and worsening cataracts but given that she is not using the meds chronically, this is less concerning.  I do encourage her to review this concern with her ophthalmologist at her next check.  Areli reports that she is still sexually active.  She states her  has ED which prevents intercourse at times.  She denies any problems with discharge, dryness, lesions or other issues.    The following portions of the patient's history were reviewed and updated as appropriate: allergies, current medications, past family history, past medical history, past social history, past surgical history and problem list.    Review of Systems  Pertinent items are noted in HPI.  A 12 point comprehensive review of systems was negative except as noted.    Immunization History   Administered Date(s) Administered     DT (pediatric) 05/06/2003     Hep A, historic 04/29/2008, 04/27/2009     Influenza high dose, seasonal 10/20/2015, 11/02/2016     Influenza, seasonal,quad inj 6-35 mos 11/05/2008, 09/14/2009, 10/25/2010, 11/20/2012, 10/17/2013, 10/22/2014     Pneumo Conj 13-V (2010&after) 10/20/2015     Pneumo Polysac 23-V 11/02/2016     Td, historic 05/06/2003     Tdap 05/04/2012     ZOSTER 12/19/2012     No results found for this or any previous visit (from the past 240 hour(s)).    Patient Active Problem List   Diagnosis     Rectal Polyps     Vitamin D Deficiency     Tinea Pedis     Restless Legs Syndrome     Allergies     Osteoporosis     Chronic Sinusitis     Hearing Loss     Impaired Fasting Glucose     Lumbago     Family History   Problem Relation Age of Onset     Hypertension Mother      Cancer Mother      ovarian     Osteoporosis Mother      Heart disease Father      Social History  "    Social History     Marital status:      Spouse name: N/A     Number of children: N/A     Years of education: N/A     Occupational History     Not on file.     Social History Main Topics     Smoking status: Never Smoker     Smokeless tobacco: Not on file     Alcohol use Yes      Comment: 2 drinks/week     Drug use: No     Sexual activity: Yes     Partners: Male     Birth control/ protection: None     Other Topics Concern     Not on file     Social History Narrative       Objective:    /66 (Patient Site: Right Arm, Patient Position: Sitting, Cuff Size: Adult Regular)  Pulse 60  Temp 98.2  F (36.8  C) (Oral)   Resp 14  Ht 5' 3.5\" (1.613 m)  Wt 129 lb 12.8 oz (58.9 kg)  BMI 22.63 kg/m2      General Appearance:    Alert, cooperative, no distress, appears stated age   Head:    Normocephalic, without obvious abnormality, atraumatic   Eyes:    PERRL, conjunctiva/corneas clear, EOM's intact both eyes   Ears:    Normal TM's and external ear canals, both ears--hearing aids bilaterally    Nose:   Nares normal, septum midline, mucosa normal   Throat:   Lips, mucosa, and tongue normal; teeth and gums normal   Neck:   Supple, symmetrical, trachea midline, no adenopathy;     thyroid:  no enlargement/tenderness/nodules; no carotid    bruit or JVD   Back:     Symmetric, no curvature, no CVA tenderness   Lungs:     Clear to auscultation bilaterally, respirations unlabored   Chest Wall:    No tenderness or deformity    Heart:    Regular rate and rhythm, S1 and S2 normal, no murmur, rub   or gallop   Breast Exam:    No tenderness, masses, or nipple abnormality   Abdomen:     Soft, non-tender, bowel sounds active all four quadrants,     no masses, no organomegaly   Genitalia:   normal external female genitalia    Rectal:   normal external rectal tissue    Extremities:   Extremities normal, atraumatic, no cyanosis or edema   Pulses:   2+ and symmetric all extremities   Skin:   Skin color, texture, turgor normal, no " rashes or lesions       Neurologic:   CNII-XII intact

## 2021-06-11 NOTE — TELEPHONE ENCOUNTER
Refill Approved    Rx renewed per Medication Renewal Policy. Medication was last renewed on 8/3/20, last OV 7/13/20.    Colette Hoyt, [facility] Triage/Med Refill 8/30/2020     Requested Prescriptions   Pending Prescriptions Disp Refills     pravastatin (PRAVACHOL) 20 MG tablet [Pharmacy Med Name: PRAVASTATIN SODIUM 20 MG TAB] 30 tablet 2     Sig: TAKE 1 TABLET BY MOUTH EVERY DAY IN THE EVENING       Statins Refill Protocol (Hmg CoA Reductase Inhibitors) Passed - 8/26/2020 10:31 AM        Passed - PCP or prescribing provider visit in past 12 months      Last office visit with prescriber/PCP: 1/17/2020 Leelee Butcher MD OR same dept: 1/17/2020 Leelee Butcher MD OR same specialty: 1/17/2020 Leelee Butcher MD  Last physical: 7/13/2020 Last MTM visit: Visit date not found   Next visit within 3 mo: Visit date not found  Next physical within 3 mo: Visit date not found  Prescriber OR PCP: Leelee Butcher MD  Last diagnosis associated with med order: 1. Hyperlipidemia, unspecified hyperlipidemia type  - pravastatin (PRAVACHOL) 20 MG tablet [Pharmacy Med Name: PRAVASTATIN SODIUM 20 MG TAB]; TAKE 1 TABLET BY MOUTH EVERY DAY IN THE EVENING  Dispense: 30 tablet; Refill: 2    If protocol passes may refill for 12 months if within 3 months of last provider visit (or a total of 15 months).

## 2021-06-13 NOTE — PROGRESS NOTES
1. Osteoporosis  DXA Bone Density Scan       Return in about 6 months (around 4/24/2018) for Recheck.    Patient Instructions   Prolia in November, May 2018, November 2018 - that will be 2 years. You can see my nurse for all 3 doses.  You skip the dose in May 2019.  I will see you in Nov 2019 with DXA scan.        Chief Complaint   Patient presents with     Osteoporosis Follow Up     discuss Dexa scan       /60  Pulse 62  Wt 133 lb (60.3 kg)  BMI 23.19 kg/m2      Did you experience any problems with previous Prolia injection? no  Any medication change in the last 6 months? no  Did you take prednisone or other immunosupressant drugs in the last 6 months   (chemo, transplant, rheum, dermatology conditions)? no  Did you have any serious infection in the last 6 months?no  Any recent hospitalizations?no  Do you plan any dental work in the next 2-3 months?no  How much calcium do you take daily from the diet and supplements?1200 mg  How much vit D do you take daily? 2000 IU  Last DXA? Done today, discussed and reviewed.      Patient is here today for the discussion about Prolia injection. 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  We restarted Prolia a year ago. We discussed calcium and vit D daily needs today.   Next Prolia injection will be in 6 months.  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  The pain in her jaw completely resolved and as I said she did not have a Prolia for year and a half.  Now when she is back on Prolia for a year, no similar side effects.  She would like to continue with the Prolia and have a break of 6 months every 2-2-1/2 years.  It is hard to say if that will prevent any necrotic processing her job bone but will definitely interfere with osteoporotic process in her bones.  The evidence showed that the large amount of fractures actually happened when  patient's forearm Prolia are placed on a drug holiday.  We discussed everything and she still would like to have a break in her Prolia treatment in the future.  We may need to schedule and she will see my nurse every 6 months and I will see her for the follow-up in 2 years when the next DEXA scan will be done.    25 minutes spent with the patient and more then 50 % of the time in counseling.  This note has been dictated using voice recognition software. Any grammatical or context distortions are unintentional and inherent to the software      Patient Active Problem List   Diagnosis     Rectal Polyps     Vitamin D Deficiency     Tinea Pedis     Restless Legs Syndrome     Allergies     Osteoporosis     Chronic Sinusitis     Hearing loss     Lumbago       Current Outpatient Prescriptions on File Prior to Visit   Medication Sig Dispense Refill     acetaminophen (TYLENOL) 325 MG tablet Take 650 mg by mouth as needed for pain.       ascorbic acid (VITAMIN C) 1000 MG tablet Take 500 mg by mouth daily.        cetirizine (ZYRTEC) 10 MG tablet Take 10 mg by mouth as needed for allergies.       cholecalciferol, vitamin D3, (VITAMIN D3) 2,000 unit cap Take 3,000 Units by mouth daily.        denosumab 60 mg/mL Syrg Inject 60 mg under the skin.       pramipexole (MIRAPEX) 0.25 MG tablet TAKE 1-2 TABLET BY MOUTH AT BEDTIME 2 HRS BEFORE SYMPTOMS OCCUR (4MG MAX/DAY) 180 tablet 2     VIT C/VIT E AC/LUT/COPPER/ZINC (PRESERVISION LUTEIN ORAL) Take 1 capsule by mouth daily.       [DISCONTINUED] IBUPROFEN ORAL Take 400 mg by mouth as needed.       [DISCONTINUED] mometasone (NASONEX) 50 mcg/actuation nasal spray 1-2 sprays into each nostril daily. 17 g 5     [DISCONTINUED] therapeutic multivitamin (THERAGRAN) tablet Take 1 tablet by mouth daily.       No current facility-administered medications on file prior to visit.

## 2021-06-13 NOTE — TELEPHONE ENCOUNTER
Refill Approved    Rx renewed per Medication Renewal Policy. Medication was last renewed on 7/13/20.    Rosetta Loya, [facility] Triage/Med Refill 12/1/2020     Requested Prescriptions   Pending Prescriptions Disp Refills     pramipexole (MIRAPEX) 0.25 MG tablet [Pharmacy Med Name: PRAMIPEXOLE 0.25 MG TABLET] 180 tablet 1     Sig: TAKE 2 TABLETS BY MOUTH 2-3 HOURS BEFORE BEDTIME. MAX 0.75 MG DAILY       Parkinson's Meds I Refill Protocol Passed - 11/30/2020  3:44 PM        Passed - PCP or prescribing provider visit in past 6 months      Last office visit with prescriber/PCP: Visit date not found OR same dept: 1/17/2020 Leelee Butcher MD OR same specialty: 1/17/2020 Leelee Butcher MD Last physical: 7/13/2020 Last MTM visit: Visit date not found     Next appt within 3 mo: Visit date not found  Next physical within 3 mo: Visit date not found  Prescriber OR PCP: Leelee Butcher MD  Last diagnosis associated with med order: 1. Restless Legs Syndrome  - pramipexole (MIRAPEX) 0.25 MG tablet [Pharmacy Med Name: PRAMIPEXOLE 0.25 MG TABLET]; TAKE 2 TABLETs BY MOUTH 2-3 HOURS BEFORE BEDTIME. MAX 0.75 MG DAILY  Dispense: 180 tablet; Refill: 1    If protocol passes may refill for 6 months if within 3 months of last provider visit (or a total of 9 months).             Pramipexole/Ropinirole Refill Protocol Passed - 11/30/2020  3:44 PM        Passed - PCP or prescribing provider visit in past 6 months      Last office visit with prescriber/PCP: Visit date not found OR same dept: 1/17/2020 Leelee Butcher MD OR same specialty: 1/17/2020 Leelee Butcher MD Last physical: 7/13/2020 Last MTM visit: Visit date not found         Next appt within 3 mo: Visit date not found  Next physical within 3 mo: Visit date not found  Prescriber OR PCP: Leelee Butcher MD  Last diagnosis associated with med order: 1. Restless Legs Syndrome  - pramipexole (MIRAPEX) 0.25 MG tablet [Pharmacy Med Name: PRAMIPEXOLE 0.25 MG TABLET]; TAKE  2 TABLETs BY MOUTH 2-3 HOURS BEFORE BEDTIME. MAX 0.75 MG DAILY  Dispense: 180 tablet; Refill: 1     If protocol passes may refill for 6 months if within 3 months of last provider visit (or a total of 9 months).

## 2021-06-13 NOTE — PROGRESS NOTES
Assessment:   Areli Higginbotham was seen in preoperative consultation in preparation for YAG capsulotomy right eye . This is a low risk surgery and the patient has no increased risk for major cardiac complications based on exam and history and appears to be medically stable for the proposed procedure.      67 y.o. female with planned surgery as above.    Known risk factors for perioperative complications: None    Difficulty with intubation is not anticipated.      Cardiac Risk Estimation: low risk.    Current medications which may produce withdrawal symptoms if withheld perioperatively: none       Plan:      1. Preoperative workup as follows none.  2. Change in medication regimen before surgery: none, continue medication regimen including morning of surgery, with sip of water.  3. Prophylaxis for cardiac events with perioperative beta-blockers: not indicated.  4. Invasive hemodynamic monitoring perioperatively: not indicated.  5. Deep vein thrombosis prophylaxis postoperatively:regimen to be chosen by surgical team.  6. Surveillance for postoperative MI with ECG immediately postoperatively and on postoperative days 1 and 2 AND troponin levels 24 hours postoperatively and on day 4 or hospital discharge (whichever comes first): not indicated.  7. Other measures: none      Subjective:      Areli Higginbotham is a 67 y.o. female who presents to the office today for a preoperative consultation at the request of surgeon Dr. Aguilar who plans on performing YAG capsulotomy right eye on October 9. This consultation is requested for the specific conditions prompting preoperative evaluation (i.e. because of potential affect on operative risk): none. Planned anesthesia: local. The patient has the following known anesthesia issues: none. Patients bleeding risk: no recent abnormal bleeding and no remote history of abnormal bleeding. Patient does not have objections to receiving blood products if needed.    History of present  illness: history of cataract surgery and within the past 1 year, worsening cloudiness of the lens in the right eye. At last visit with Eye doctor, encouraged to pursue this procedure to improve vision. No recent or remote SOB, chest pain, chest pressure, palpitations.No recent illness, fevers, chills. Has tolerated previous surgeries and procedures well without reaction to medication or anesthesia. No history of bleeding problems or disorder.     The following portions of the patient's history were reviewed and updated as appropriate: allergies, current medications, past family history, past medical history, past social history, past surgical history and problem list.    Review of Systems  A 12 point comprehensive review of systems was negative except as noted.      Objective:     There were no vitals taken for this visit.  General: Patient appears to be in no acute distress.  Eyes: Inferior palpebral conjunctiva clear and pink, no exudates or tearing. Sclera are white. Eyelids symmetrical, no erythema, flakiness, fasciculations, or ptosis. Pupils react equally to Light and accommodation. EOMS intact. No lid lag, no nystagmus, corneal reflex equal bilaterally, cornea and lens clear, red reflex present, optic disc cream colored with well defined borders. Retina pink, no hemorrhages or exudates.  Ears: No nodules, lesions, masses, discharge or tenderness in auricles or mastoid area. No cerumen in ear canals. Tympanic membranes pearly gray, normal bony landmarks and cone of light bilaterally, no bulges or perforations.   Oropharynx: Buccal mucosa pink, moist, no lesions. Tongue midline, spongy, pink. Uvula midline. Pharynx with no erythema, no exudates. Tonsils + 1.  Neck: Full range of motion, no pain with palpation of spine or paraspinal muscles.  Lungs: Unlabored. clear breath sounds heard throughout lung fields.   Heart: Regular rate and rhythm.  Abdomen: Soft rounded abdomen, no CVA tenderness.    Musculoskeletal:  muscles appear symmetric  Neuro: Coordinated, smooth gait, balance,  DTR's+2 bilaterally brachioradialis, knee, ankle. Rhomberg s wnl.        Predictors of intubation difficulty:   Morbid obesity? no   Anatomically abnormal facies? no   Prominent incisors? no   Receding mandible? no   Short, thick neck? no   Neck range of motion: normal   Mallampati score: I (soft palate, uvula, fauces, and tonsillar pillars visible)   Dentition: No chipped, loose, or missing teeth.    Cardiographics  ECG: none  Echocardiogram: not done    Imaging  Chest x-ray: not done     Lab Review   not applicable       Faiza Reyes CNP    This note has been dictated using voice recognition software. Any grammatical or context distortions are unintentional and inherent to the software

## 2021-06-13 NOTE — PROGRESS NOTES
"Prolia Injection Phone Screen      Screening questions have been asked 2-3 days prior to administration visit for Prolia. If any questions are answered with \"Yes,\" this phone encounter were will routed to ordering provider for further evaluation.     1.  When was the last injection?  5/29/2020    2.  Has insurance for this injection been verified?  Yes    3.  Did you experience any new onset achiness or rashes that lasted for over a month with your previous Prolia injection?   No    4.  Do you have a fever over 101?F or a new deep cough that is unusual for you today? No    5.  Have you started any new medications in the last 6 months that you were told could affect your immune system? These may have been prescribed by oncologist, transplant, rheumatology, or dermatology.   No    6.  In the last 6 months have you have gastric bypass or parathyroid surgery?   No    7.  Do you plan dental work requiring drilling into the bone such as implants/extractions or oral surgery in the next 2-3 months?   No    8. Do you have new insurance since the last injection?    Patient informed if symptoms discussed above present prior to their administration appointment, they are to notify clinic immediately.     Noble Jaimes            The following steps were completed to comply with the REMS program for Prolia:  1. Ordering provider has previously reviewed information in the Medication Guide and Patient Counseling Chart, including the serious risks of Prolia  and the symptoms of each risk and have been advised to seek prompt medical attention if they have signs or symptoms of any of the serious risks.  2. Provided each patient a copy of the Medication Guide and Patient Brochure.  See MAR for administration details.   Indication: Prolia  (denosumab) is a prescription medicine used to treat osteoporosis in patients who:   Are at high risk for fracture, meaning patients who have had a fracture related to osteoporosis, or who have " multiple risk factors for fracture; Cannot use another osteoporosis medicine or other osteoporosis medicines did not work well.   The timeline for early/late injections would be 4 weeks early and any time after the 6 month heladio. If a patient receives their injection late, then the subsequent injection would be 6 months from the date that they actually received the injection    Have the screening questions been asked prior to this administration? Yes

## 2021-06-14 NOTE — TELEPHONE ENCOUNTER
Pt calling back to make an appt with PCP.  Call transferred to central scheduling.  RN will send this note to PCP pool in case there are no appointments available for today.  If no appts available, would you suggest UC this weekend or wait for an OV appt on Monday?       If appt not made for today, please call pt back and let her know.     Gladys Manning RN, FNA

## 2021-06-14 NOTE — TELEPHONE ENCOUNTER
Pt called stating she fell Sunday on her tailbone and it is really hurting her to say the least. Looking for some sort of advise - please advise

## 2021-06-14 NOTE — TELEPHONE ENCOUNTER
Please use heating pad on tailbone. Can use a donut cushion to relieve pressure on tailbone. Can take tylenol 1000 mg three times a day scheduled. She should be seen in clinic for exam and potential XR if pain doesn't improve in the next 3-4 days.

## 2021-06-14 NOTE — PROGRESS NOTES
ASSESSMENT/ PLAN    Areli was seen today for fall.    Fall, initial encounter  Pain in the coccyx  Mechanical fall, not suspecting other underlying reason for fall except mechanical. Exam shows ttp of the musculature to the right to coccyx. XR obtained and personally viewed and discussed with patient. Nothing concerning. Formal radiology review also negative. Discussed continuing with tylenol 1000 mg three times a day, heating pad/warm bath, voltaren gel prn to the area. If not improving in 4-6 weeks, consider pursuing further imaging and/or PT. Patient verbalized understanding.   -     XR Sacrum and Coccyx 2 or More VWS; Future  -     diclofenac sodium (VOLTAREN) 1 % Gel; Apply 4 g topically 4 (four) times a day. Apply to tailbone area          This note was created using Dragon dictation software, spelling errors may occur.     Leelee Butcher MD        SUBJECTIVE   Areli Higginbotham is a 70 y.o. old female who presented to clinic today for further evaluation of ongoing tailbone pain. Fell about 5 days ago. She sat down at her dining chair but chair slipped out underneath her and she landed on her buttock on the floor. Pain in the buttock came on a little bit after. Pain is near the coccyx, to the right a little bit. Denies numbness/tingling, weakness, urinary/stool incontinence. Has followed my advise and been using heating pad, donut cushion. Has taken tylenol 1000 mg three times a day. However pain has not improved, however has not worsened either. Denies fever/chills. She's been ambulating without difficulty. Sitting exacerbates her pain. Hard to sleep at night as she has to shift her position to not lie on her tailbone.     Review of Systems:  Negative except as noted in HPI    The following portions of the patient's history were reviewed and updated as appropriate: past medical history, past surgical history, family history, allergies, current medications and problem list.    Medical History  Active  Ambulatory (Non-Hospital) Problems    Diagnosis     Personal history of other drug therapy     Bilateral sensorineural hearing loss     Lumbago     Rectal Polyps     Restless Legs Syndrome     Environmental allergies     Osteoporosis     Chronic Sinusitis     Hearing loss     Past Medical History:   Diagnosis Date     Osteoporosis      Restless legs syndrome (RLS)        Surgical History  She  has a past surgical history that includes pr biopsy of breast, incisional; pr kendrick w/o facetec foramot/dskc 1/2 vrt seg, lumbar; pr remove total disc arthroplasty, lumbar, single; pr hemorrhoidectomy internal rubber band ligations; pr partial removal of hymen; and pr open tx phalangeal shaft fracture prox/middle ea.    Social History  Reviewed, and she  reports that she has never smoked. She has never used smokeless tobacco. She reports current alcohol use. She reports that she does not use drugs.    Family History  Reviewed, and family history includes Cancer in her mother; Heart disease in her father; Hypertension in her mother; Osteoporosis in her mother.    Medications  Reviewed and reconciled    Allergies  Allergies   Allergen Reactions     Erythromycin Base      upset stomach       Midazolam      Annotation: severe restless leg symptoms           OBJECTIVE  Physical Exam:  Vital signs: /70 (Patient Site: Left Arm, Patient Position: Sitting, Cuff Size: Adult Regular)   Pulse 66   Resp 16   Wt 136 lb 3.2 oz (61.8 kg)   SpO2 99%   BMI 24.91 kg/m    Weight: 136 lb 3.2 oz (61.8 kg)    General appearance: pleasant, appears stated age, cooperative and in no distress  Musculoskeletal: no midline spinal tenderness, coccyx non tender to palpation but the paraspinal musculature adjacent to right sided aspect of coccyx tender to palpation, no SI joint tenderness, straight leg raise negative.   Skin: no rashes  Neuro: alert oriented x 3, grossly normal otherwise  Psych: normal affect, appropriate conversation

## 2021-06-14 NOTE — PATIENT INSTRUCTIONS - HE
Continue taking tylenol 1000 mg three times a day for 2 weeks. Keep doing heating pad or warm bath 3 times day. Use voltaren gel on the area of pain. Let me know if not better in 4-5 weeks then we'll consider physical therapy.

## 2021-06-16 PROBLEM — H90.3 BILATERAL SENSORINEURAL HEARING LOSS: Status: ACTIVE | Noted: 2019-05-28

## 2021-06-16 PROBLEM — Z92.29 PERSONAL HISTORY OF OTHER DRUG THERAPY: Status: ACTIVE | Noted: 2019-11-20

## 2021-06-17 NOTE — PATIENT INSTRUCTIONS - HE
Patient Instructions by Angelita Glasgow MD at 5/28/2019  1:40 PM     Author: Angelita Glasgow MD Service: -- Author Type: Physician    Filed: 5/28/2019  2:03 PM Encounter Date: 5/28/2019 Status: Signed    : Angelita Glasgow MD (Physician)         Patient Education   Signs of Hearing Loss  Hearing loss is a problem shared by many people. In fact, it is one of the most common health conditions, particularly as people age. Most people over age 65 have some hearing loss, and by age 80, almost everyone does. Because hearing loss usually occurs slowly over the years, you may not realize your hearing ability has gotten worse.       Have your hearing checked  Contact your [facility] care provider if you:    Have to strain to hear normal conversation.    Have to watch other peoples faces very carefully to follow what theyre saying.    Need to ask people to repeat what theyve said.    Often misunderstand what people are saying.    Turn the volume of the television or radio up so high that others complain.    Feel that people are mumbling when theyre talking to you.    Find that the effort to hear leaves you feeling tired and irritated.    Notice, when using the phone, that you hear better with 1 ear than the other.    [id number] [employer]. [address] [place] [address], [place] [id number]. All rights reserved. This information is not intended as a substitute for professional medical care. Always follow your healthcare professional's instructions.           Advance Directive  Patients advance directive was discussed and I am comfortable with the patients wishes.  Patient Education   Personalized Prevention Plan  You are due for the preventive services outlined below.  Your care team is available to assist you in scheduling these services.  If you have already completed any of these items, please share that information with your care team to update in your medical record.  Health Maintenance   Topic Date Due   ? ZOSTER  VACCINES (2 of 3) 02/13/2013   ? FALL RISK ASSESSMENT  05/21/2019   ? DXA SCAN  10/24/2019   ? MAMMOGRAM  11/28/2020   ? TD 18+ HE  05/04/2022   ? COLONOSCOPY  09/08/2022   ? ADVANCE DIRECTIVES DISCUSSED WITH PATIENT  05/21/2023   ? PNEUMOCOCCAL POLYSACCHARIDE VACCINE AGE 65 AND OVER  Completed   ? INFLUENZA VACCINE RULE BASED  Completed   ? PNEUMOCOCCAL CONJUGATE VACCINE FOR ADULTS (PCV13 OR PREVNAR)  Completed

## 2021-06-17 NOTE — PROGRESS NOTES
Assessment & Plan     Vaginal bleeding  Plan:   - CT Abdomen Pelvis With Oral With IV Contrast; Future    - Comprehensive Metabolic Panel  - HM1(CBC and Differential)  - Urinalysis-UC if Indicated  - Lactate Dehydrogenase (LDH)  - Erythrocyte Sedimentation Rate  We will follow-up to the results of the study and manage accordingly.      - Ambulatory referral to Obstetrics / Gynecology             No follow-ups on file.    Morales Turner MD  [facility]   Areli Higginbotham is 70 y.o. and presents today for having a small  intermittent spotty vaginal bleeding since yesterday.   She has no other associated symptoms with it.           Objective    /68   Pulse 90   Wt 135 lb (61.2 kg)   SpO2 97%   BMI 24.69 kg/m    Body mass index is 24.69 kg/m .     Physical Exam  General Appearance:    Alert,  no acute distress, well hydrated    Eyes:    , non icterus    Abdomen:      Soft, Non tender, non distended  , normal bowel sounds, no rebound or guarding, no masses, no organomegaly   Extremities:   Extremities normal, atraumatic, no cyanosis or edema   Skin:   Skin color, texture, turgor normal, no rashes or lesions      Vag exam deferred to GYN.

## 2021-06-17 NOTE — TELEPHONE ENCOUNTER
Patient states she woke this morning with some vaginal discharge that appears to have some blood in it.  Connected to scheduling per patient request.    Cheri Steven RN  [facility] Triage Nurse Advisor    Reason for Disposition    Patient wants to be seen    Additional Information    Negative: Pain or burning with passing urine (urination) is main symptom    Negative: Pregnant with vaginal discharge    Negative: SEVERE abdominal pain (e.g., excruciating)    Negative: Patient sounds very sick or weak to the triager    Negative: Yellow or green vaginal discharge and has a fever    Negative: Constant abdominal pain lasting > 2 hours    Negative: Mild lower abdominal pain comes and goes (cramps) that lasts > 24 hours    Negative: Genital area looks infected (e.g., draining sore, spreading redness)    Negative: Rash is tiny water blisters (3 or more)    Protocols used: VAGINAL [id number]

## 2021-06-17 NOTE — TELEPHONE ENCOUNTER
Refill Approved    Rx renewed per Medication Renewal Policy. Medication was last renewed on 8/30/20.    Ekaterina Sin, [facility] Triage/Med Refill 5/20/2021     Requested Prescriptions   Pending Prescriptions Disp Refills     pravastatin (PRAVACHOL) 20 MG tablet [Pharmacy Med Name: PRAVASTATIN SODIUM 20 MG TAB] 90 tablet 2     Sig: TAKE 1 TABLET BY MOUTH EVERY DAY IN THE EVENING       Statins Refill Protocol (Hmg CoA Reductase Inhibitors) Passed - 5/19/2021 12:15 AM        Passed - PCP or prescribing provider visit in past 12 months      Last office visit with prescriber/PCP: 12/31/2020 Leelee Butcher MD OR same dept: 12/31/2020 Leelee Butcher MD OR same specialty: 5/3/2021 Morales Turner MD  Last physical: 7/13/2020 Last MTM visit: Visit date not found   Next visit within 3 mo: Visit date not found  Next physical within 3 mo: Visit date not found  Prescriber OR PCP: Leelee Butcher MD  Last diagnosis associated with med order: 1. Hyperlipidemia, unspecified hyperlipidemia type  - pravastatin (PRAVACHOL) 20 MG tablet [Pharmacy Med Name: PRAVASTATIN SODIUM 20 MG TAB]; TAKE 1 TABLET BY MOUTH EVERY DAY IN THE EVENING  Dispense: 90 tablet; Refill: 2    If protocol passes may refill for 12 months if within 3 months of last provider visit (or a total of 15 months).

## 2021-06-17 NOTE — PROGRESS NOTES
CC: Areli Higginbotham is here secondary to some vaginal bleeding.    HPI: The pt is a 70 y.o. MWF P0 who presents with some bleeding that happened on May 3.  She had 2 episodes of very light bleeding that morning after her usual exercise regimen.  She didn't have any cramping.  She has had no bleeding since that time, and she has continued her regular exercise.  She became menopausal at 54.  About 6 months after becoming menopausal she had a similar episode, but she's had no bleeding since then.  She had a CT scan on 5/5/21 that didn't show any abnormalities in the pelvis, but there isn't an endometrial thickness measurement reported.    Past Medical History:   Diagnosis Date     Osteoporosis      Restless legs syndrome (RLS)        Past Surgical History:   Procedure Laterality Date     UT BIOPSY OF BREAST, INCISIONAL      Description: Biopsy Breast Open;  Proc Date: 09/01/2003;     UT HEMORRHOIDECTOMY INTERNAL RUBBER BAND LIGATIONS      Description: Hemorrhoidectomy;  Proc Date: 06/01/1999;     UT KESSLER W/O FACETEC FORAMOT/DSKC 1/2 VRT SEG, LUMBAR      Description: Laminectomy Decompressive Up To Two Lumbar Segments;  Recorded: 04/29/2008;  Comments: L4-5     UT OPEN TX PHALANGEAL SHAFT FRACTURE PROX/MIDDLE EA      Description: Open Treatment Of Each Fracture Of Proximal Finger Phalanx;  Recorded: 04/29/2008;  Comments: R 5th finger w/ pinning     UT PARTIAL REMOVAL OF HYMEN      Description: Partial Hymenectomy;  Recorded: 04/29/2008;     UT REMOVE TOTAL DISC ARTHROPLASTY, LUMBAR, SINGLE      Description: Total Disc Arthroplasty Removal Lumbar Single Interspace;  Recorded: 04/29/2008;  Comments: L5       Patient's   Family History   Problem Relation Age of Onset     Hypertension Mother      Cancer Mother         ovarian     Osteoporosis Mother      Heart disease Father        Patient   Social History     Socioeconomic History     Marital status:      Spouse name: None     Number of children: None     Years of  education: None     Highest education level: None   Occupational History     None   Social Needs     Financial resource strain: None     Food insecurity     Worry: None     Inability: None     Transportation needs     Medical: None     Non-medical: None   Tobacco Use     Smoking status: Never Smoker     Smokeless tobacco: Never Used   Substance and Sexual Activity     Alcohol use: Yes     Comment: 2 drinks/week     Drug use: No     Sexual activity: Yes     Partners: Male     Birth control/protection: None   Lifestyle     Physical activity     Days per week: None     Minutes per session: None     Stress: None   Relationships     Social connections     Talks on phone: None     Gets together: None     Attends Yazdanism service: None     Active member of club or organization: None     Attends meetings of clubs or organizations: None     Relationship status: None     Intimate partner violence     Fear of current or ex partner: None     Emotionally abused: None     Physically abused: None     Forced sexual activity: None   Other Topics Concern     None   Social History Narrative     None       Outpatient Medications Prior to Visit   Medication Sig Dispense Refill     acetaminophen (TYLENOL) 325 MG tablet Take 1,000 mg by mouth 3 (three) times a day.        ascorbic acid (VITAMIN C) 1000 MG tablet Take 500 mg by mouth daily.        azelastine (ASTELIN) 137 mcg (0.1 %) nasal spray 2 sprays each nostril once daily 30 mL 12     CALCIUM ORAL Take 1 tablet by mouth daily.       cetirizine (ZYRTEC) 10 MG tablet Take 10 mg by mouth as needed for allergies.       cholecalciferol, vitamin D3, (VITAMIN D3) 2,000 unit cap Take 3,000 Units by mouth daily.        pramipexole (MIRAPEX) 0.25 MG tablet TAKE 2 TABLETS BY MOUTH 2-3 HOURS BEFORE BEDTIME. MAX 0.75 MG DAILY 180 tablet 1     pravastatin (PRAVACHOL) 20 MG tablet Take 1 tablet (20 mg total) by mouth every evening. 90 tablet 2     VIT C/VIT E AC/LUT/COPPER/ZINC (PRESERVISION  LUTEIN ORAL) Take 1 capsule by mouth daily.       Facility-Administered Medications Prior to Visit   Medication Dose Route Frequency Provider Last Rate Last Admin     denosumab 60 mg (PROLIA 60 mg/ml)  60 mg Subcutaneous Q6 Months Fabricio Miller MD   60 mg at 05/29/20 0923       Patient is allergic to erythromycin base and midazolam.    ROS:  12 part ROS is negative aside from those symptoms in the HPI    PE:  /68 (Patient Site: Right Arm, Patient Position: Sitting, Cuff Size: Adult Regular)   Pulse 71   Wt 136 lb (61.7 kg)           Body mass index is 24.87 kg/m .    General: WN/WD WF, NAD  Psych: normal mood  Neuro: CN I-XII grossly intact  MS: normal gait    Assessment: 70 y.o. MWF P0 with PMB, likely atrophic.    Plan: Natural history of PMB discussed with the patient.  We discussed 3 options since her bleeding was light and only on 1 day with no recurrence.  First would be expectant management; if she bleeds again would move to options 2 or 3.  Option 2 would be to get a pelvic ultrasound to evaluate the endometrium. If it's <5 mm, no further action needs to happen.  If it's 5 mm or more, then she needs an endometrial biopsy.  Third would be to try the endometrial biopsy today.  After discussing options, she elected to proceed with the ultrasound.  She will be notified of the results.  We discussed that if she needs an endometrial biopsy I will send in a prescription for misoprostol for her to use first as she is menopausal and nulliparous.  Questions were answered to the best of my ability.      27 minutes spent on the date of the encounter doing chart review, review of test results, patient visit and documentation

## 2021-06-18 NOTE — PATIENT INSTRUCTIONS - HE
Patient Instructions by Leelee Butcher MD at 7/13/2020 11:00 AM     Author: Leelee Bucther MD Service: -- Author Type: Physician    Filed: 7/13/2020 11:18 AM Encounter Date: 7/13/2020 Status: Signed    : Leelee Butcher MD (Physician)         Patient Education   Signs of Hearing Loss  Hearing loss is a problem shared by many people. In fact, it is one of the most common health conditions, particularly as people age. Most people over age 65 have some hearing loss, and by age 80, almost everyone does. Because hearing loss usually occurs slowly over the years, you may not realize your hearing ability has gotten worse.       Have your hearing checked  Contact your [facility] care provider if you:    Have to strain to hear normal conversation.    Have to watch other peoples faces very carefully to follow what theyre saying.    Need to ask people to repeat what theyve said.    Often misunderstand what people are saying.    Turn the volume of the television or radio up so high that others complain.    Feel that people are mumbling when theyre talking to you.    Find that the effort to hear leaves you feeling tired and irritated.    Notice, when using the phone, that you hear better with 1 ear than the other.    0303-3241 [employer]. [address] [place] [address], [place] [address]. All rights reserved. This information is not intended as a substitute for professional medical care. Always follow your healthcare professional's instructions.           Advance Directive  Patients advance directive was discussed and I am comfortable with the patients wishes.  Patient Education   Personalized Prevention Plan  You are due for the preventive services outlined below.  Your care team is available to assist you in scheduling these services.  If you have already completed any of these items, please share that information with your care team to update in your medical record.  Health Maintenance   Topic Date Due   ?  ZOSTER VACCINES (2 of 3) 02/13/2013   ? MEDICARE ANNUAL WELLNESS VISIT  05/28/2020   ? INFLUENZA VACCINE RULE BASED (1) 08/01/2020   ? FALL RISK ASSESSMENT  04/10/2021   ? DXA SCAN  11/20/2021   ? MAMMOGRAM  12/10/2021   ? TD 18+ HE  05/04/2022   ? LIPID  05/21/2023   ? ADVANCE CARE PLANNING  05/28/2024   ? COLORECTAL CANCER SCREENING  09/08/2027   ? HEPATITIS C SCREENING  Completed   ? PNEUMOCOCCAL IMMUNIZATION 65+ LOW/MEDIUM RISK  Completed

## 2021-06-18 NOTE — PROGRESS NOTES
Assessment and Plan:       1. Routine general medical examination at a health care facility  Immunizations reviewed and utd--- would recommend patient get Shingrix, information given and she will check with insurance regarding coverage  Colonosocopy reviewed 9/17, repeat 5 years.  Mammography reviewed--patient brought copy from [facility].  Normal 11/17.  We will scan copy in chart.  Pap N/A based on age and risk factors.  Routine Dental and Eye care recommended  Discussed importance of regular exercise and appropriate calcium intake  Discussed Advance Directives-- patient has completed and will get a copy to us    - Lipid Cascade FASTING  - Hepatitis C Antibody (Anti-HCV)  - Glucose    2. Restless Legs Syndrome  Doing well with Mirapex at current dose.  No changes.    3. Hearing loss  Loss of low tones somewhat atypical.  Continue with bilateral hearing aids and regular follow-up with cardiology    4. Age-related osteoporosis without current pathological fracture  Follows with Dr. Miller.  Currently on Prolia.  Continue with calcium, D, exercise.    5. Environmental allergies  Takes daily Zyrtec year round due to environmental allergies.  Seasonal flares cause more problems especially with allergic rhinitis.  She has not been able to tolerate steroid spray so we will try Astelin spray--new Rx sent.        The patient's current medical problems were reviewed.    I have had an Advance Directives discussion with the patient.  The following health maintenance schedule was reviewed with the patient and provided in printed form in the after visit summary:   Health Maintenance   Topic Date Due     FALL RISK ASSESSMENT  09/29/2018     DXA SCAN  10/24/2019     MAMMOGRAM  11/15/2019     ADVANCE DIRECTIVES DISCUSSED WITH PATIENT  09/25/2020     TD 18+ HE  05/04/2022     COLONOSCOPY  09/08/2022     PNEUMOCOCCAL POLYSACCHARIDE VACCINE AGE 65 AND OVER  Completed     INFLUENZA VACCINE RULE BASED  Completed     PNEUMOCOCCAL  CONJUGATE VACCINE FOR ADULTS (PCV13 OR PREVNAR)  Completed     ZOSTER VACCINE  Completed        Subjective:   Chief Complaint: Areli Higginbotham is an 67 y.o. female here for an Annual Wellness visit.   HPI: Patient doing well.  She is fasting today for labs and would like her cholesterol rechecked.  She not had glucose in about 3 years as well.  Areli follows with Dr. Miller for osteoporosis management and Prolia injections every 6 months.  She seems to be doing well with this.  She is taking some additional calcium and vitamin D.  Vitamin D deficiency have been noted in the past but has been stable over the last 6 years.  Patient is doing exercise including weightbearing exercise.  No history of falls or fracture.  Areli has history of some restless legs.  She is responded very well to Mirapex at 0.25 mg.  She takes 2 tabs at approximately dinnertime with no issues with symptoms overnight.  She like to stay on current dose.  There is also history of environmental allergies.  Patient chronically takes Zyrtec.  She has tried nasal steroid sprays for significant rhinitis symptoms in the past and is unable to tolerate due to nasal irritation, bloody nose.  She is open to trying something else if there is another option available.  There is a history of some chronic sinusitis issues.    Review of Systems:  Please see above.  The rest of the review of systems are negative for all systems.    Patient Care Team:  Angelita Glasgow MD as PCP - General     Patient Active Problem List   Diagnosis     Rectal Polyps     Vitamin D Deficiency     Tinea Pedis     Restless Legs Syndrome     Allergies     Osteoporosis     Chronic Sinusitis     Hearing loss     Lumbago     Past Medical History:   Diagnosis Date     Osteoporosis      Restless legs syndrome (RLS)       Past Surgical History:   Procedure Laterality Date     UT BIOPSY OF BREAST, INCISIONAL      Description: Biopsy Breast Open;  Proc Date: 09/01/2003;     UT  HEMORRHOIDECTOMY INTERNAL RUBBER BAND LIGATIONS      Description: Hemorrhoidectomy;  Proc Date: 06/01/1999;     AR KESSLER W/O FACETEC FORAMOT/DSKC 1/2 VRT SEG, LUMBAR      Description: Laminectomy Decompressive Up To Two Lumbar Segments;  Recorded: 04/29/2008;  Comments: L4-5     AR OPEN TX PHALANGEAL SHAFT FRACTURE PROX/MIDDLE EA      Description: Open Treatment Of Each Fracture Of Proximal Finger Phalanx;  Recorded: 04/29/2008;  Comments: R 5th finger w/ pinning     AR PARTIAL REMOVAL OF HYMEN      Description: Partial Hymenectomy;  Recorded: 04/29/2008;     AR REMOVE TOTAL DISC ARTHROPLASTY, LUMBAR, SINGLE      Description: Total Disc Arthroplasty Removal Lumbar Single Interspace;  Recorded: 04/29/2008;  Comments: L5      Family History   Problem Relation Age of Onset     Hypertension Mother      Cancer Mother      ovarian     Osteoporosis Mother      Heart disease Father       Social History     Social History     Marital status:      Spouse name: N/A     Number of children: N/A     Years of education: N/A     Occupational History     Not on file.     Social History Main Topics     Smoking status: Never Smoker     Smokeless tobacco: Never Used     Alcohol use Yes      Comment: 2 drinks/week     Drug use: No     Sexual activity: Yes     Partners: Male     Birth control/ protection: None     Other Topics Concern     Not on file     Social History Narrative      Current Outpatient Prescriptions   Medication Sig Dispense Refill     acetaminophen (TYLENOL) 325 MG tablet Take 650 mg by mouth as needed for pain.       ascorbic acid (VITAMIN C) 1000 MG tablet Take 500 mg by mouth daily.        cetirizine (ZYRTEC) 10 MG tablet Take 10 mg by mouth as needed for allergies.       cholecalciferol, vitamin D3, (VITAMIN D3) 2,000 unit cap Take 3,000 Units by mouth daily.        denosumab 60 mg/mL Syrg Inject 60 mg under the skin.       pramipexole (MIRAPEX) 0.25 MG tablet TAKE 1-2 TABLET BY MOUTH AT BEDTIME 2 HRS BEFORE  "SYMPTOMS OCCUR (4MG MAX/DAY) 180 tablet 0     VIT C/VIT E AC/LUT/COPPER/ZINC (PRESERVISION LUTEIN ORAL) Take 1 capsule by mouth daily.       No current facility-administered medications for this visit.       Objective:   Vital Signs:   Visit Vitals     /62 (Patient Site: Left Arm, Patient Position: Sitting, Cuff Size: Adult Regular)     Pulse 76     Temp 98.5  F (36.9  C) (Oral)     Resp 12     Ht 5' 2.6\" (1.59 m)     Wt 131 lb 6.4 oz (59.6 kg)     BMI 23.58 kg/m2        VisionScreening:  No exam data present     PHYSICAL EXAM      General Appearance:    Alert, cooperative, no distress, appears stated age   Head:    Normocephalic, without obvious abnormality, atraumatic   Eyes:    PERRL, conjunctiva/corneas clear, EOM's intact both eyes   Ears:    Normal TM's and external ear canals, both ears--hearing aids worn bilaterally.   Nose:   Nares normal, septum midline, mucosa normal   Throat:   Lips, mucosa, and tongue normal; teeth and gums normal   Neck:   Supple, symmetrical, trachea midline, no adenopathy;     thyroid:  no enlargement/tenderness/nodules; no carotid    bruit or JVD   Back:     Symmetric, no curvature, no CVA tenderness   Lungs:     Clear to auscultation bilaterally, respirations unlabored   Chest Wall:    No tenderness or deformity    Heart:    Regular rate and rhythm, S1 and S2 normal, no murmur, rub   or gallop   Breast Exam:    No tenderness, masses, or nipple abnormality   Abdomen:     Soft, non-tender, bowel sounds active all four quadrants,     no masses, no organomegaly   Genitalia:   Normal external female genitalia.  Bimanual exam shows normal midline uterus without enlargement, no adnexal masses or tenderness were appreciated   Rectal:   Normal external rectal tissue.   Extremities:   Extremities normal, atraumatic, no cyanosis or edema   Pulses:   2+ and symmetric all extremities   Skin:   Skin color, texture, turgor normal, no rashes --normal cherry angiomas, seborrheic K, solar " lentigo.       Neurologic:   CNII-XII intact             Assessment Results 5/21/2018   Activities of Daily Living No help needed   Instrumental Activities of Daily Living No help needed   Mini Cog Total Score 5   Some recent data might be hidden     A Mini-Cog score of 0-2 suggests the possibility of dementia, score of 3-5 suggests no dementia    Identified Health Risks:     The patient was provided with written information regarding signs of hearing loss.  Patient's advanced directive was discussed and I am comfortable with the patient's wishes.

## 2021-06-20 NOTE — PROGRESS NOTES
ASSESSMENT/ PLAN    1. Vaginal irritation  Vaginal atrophy and dry mucosa seen on examination but otherwise normal.  Will empirically treat with nystatin powder as well as hydrocortisone.  Wet prep obtained today and will let her know results.  If wet prep is negative and nystatin and hydrocortisone topically do not address her symptoms, can potentially give her one dose fluconazole oral for potential yeast infection.  And if these measures do not improve her symptoms, return to clinic for further evaluation.  No concern for STD.  No pain with sexual intercourse.  - nystatin (MYCOSTATIN) powder; Apply to affected area 3 times daily  Dispense: 15 g; Refill: 0  - hydrocortisone with aloe 1 % Crea cream; Apply to affected area 2 times daily  Dispense: 112 g; Refill: 1  - Wet Prep, Vaginal    2. Restless Legs Syndrome  Reviewed recent physical, she's up to date with annual lab. Will refill for her.   - pramipexole (MIRAPEX) 0.25 MG tablet; TAKE 1-2 TABLET BY MOUTH AT BEDTIME 2 HRS BEFORE SYMPTOMS OCCUR (4MG MAX/DAY)  Dispense: 180 tablet; Refill: 1      This note was created using Dragon dictation software, spelling errors may occur.     Leelee Butcher MD        SUBJECTIVE   Arelimelania Higginbotham is a 68 y.o. old female with a past medical history of restless leg syndrome, osteoporosis, environmental allergies who presented to clinic today for further evaluation of vaginal irritation.  She reports a burning sensation around her vaginal area. This has been going on for about a week. She has tried OTC cream such as desitin which seemed to help for a while but her symptoms haven't improved. Denies fever, chills, N/V, abdominal pain, urinary symptoms, blood in urine. Doesn't think she has a rash in her vaginal area. She does sweat a lot. Denies leaking of urine.  Also would like a refill for Mirapex.      Review of Systems:  Negative except as noted in HPI    The following portions of the patient's history were reviewed and  updated as appropriate: past medical history, past surgical history, family history, allergies, current medications and problem list.    Medical History  Active Ambulatory (Non-Hospital) Problems    Diagnosis     Lumbago     Rectal Polyps     Restless Legs Syndrome     Environmental allergies     Osteoporosis     Chronic Sinusitis     Hearing loss     Past Medical History:   Diagnosis Date     Osteoporosis      Restless legs syndrome (RLS)        Surgical History  She  has a past surgical history that includes pr biopsy of breast, incisional; pr kendrick w/o facetec foramot/dskc 1/2 vrt seg, lumbar; pr remove total disc arthroplasty, lumbar, single; pr hemorrhoidectomy internal rubber band ligations; pr partial removal of hymen; and pr open tx phalangeal shaft fracture prox/middle ea.    Social History  Reviewed, and she  reports that she has never smoked. She has never used smokeless tobacco. She reports that she drinks alcohol. She reports that she does not use illicit drugs.    Family History  Reviewed, and family history includes Cancer in her mother; Heart disease in her father; Hypertension in her mother; Osteoporosis in her mother.    Medications  Reviewed and reconciled    Allergies  Allergies   Allergen Reactions     Erythromycin Base      upset stomach       Midazolam      Annotation: severe restless leg symptoms           OBJECTIVE  Physical Exam:  Vital signs: /59  Pulse 69  Resp 16  Wt 134 lb 9.6 oz (61.1 kg)  BMI 24.15 kg/m2  Weight: 134 lb 9.6 oz (61.1 kg)    General appearance: pleasant, appears stated age, cooperative and in no distress  Eyes: EOMs intact, PERRL, conjunctivae normal.   ENT: moist oral mucosa, posterior oropharynx normal, Thyroid normal to palpation, no lump or mass or tenderness  Lymph: no cervical/supraclavicular or groin adenopathy  Respiratory: clear to auscultation bilaterally, good air movement throughout, no wheezing or crackles, speaking full sentences without  difficulty  Cardiovascular: regular rate and rhythm, no murmur appreciated, no leg edema  Abdomen: active bowel sounds, soft, non-tender, non-distended, no CVA tenderness bilaterally  : vaginal atrophy, no abnormal lesion or rash on external genitalia.  Speculum exam not performed.  Skin: no rashes  Neuro: alert oriented x 3, grossly normal otherwise  Psych: normal affect, appropriate conversation

## 2021-06-21 NOTE — LETTER
Letter by Morales Turner MD at      Author: Morales Turner MD Service: -- Author Type: --    Filed:  Encounter Date: 5/10/2021 Status: (Other)         Areli Higginbotham  [address] [facility] [address]             May 10, 2021         Dear Ms. Higginbotham,    Below are the results from your recent visit:    Results for orders placed or performed in visit on 05/03/21   Comprehensive Metabolic Panel   Result Value Ref Range    Sodium 141 136 - 145 mmol/L    Potassium 4.6 3.5 - 5.0 mmol/L    Chloride 102 98 - 107 mmol/L    CO2 27 22 - 31 mmol/L    Anion Gap, Calculation 12 5 - 18 mmol/L    Glucose 114 70 - 125 mg/dL    BUN 14 8 - 28 mg/dL    Creatinine 0.73 0.60 - 1.10 mg/dL    GFR MDRD Af Amer >60 >60 mL/min/1.73m2    GFR MDRD Non Af Amer >60 >60 mL/min/1.73m2    Bilirubin, Total 0.9 0.0 - 1.0 mg/dL    Calcium 9.9 8.5 - 10.5 mg/dL    Protein, Total 7.3 6.0 - 8.0 g/dL    Albumin 4.7 3.5 - 5.0 g/dL    Alkaline Phosphatase 76 45 - 120 U/L    AST 34 0 - 40 U/L    ALT 43 0 - 45 U/L   Urinalysis-UC if Indicated   Result Value Ref Range    Color, UA Yellow Colorless, Yellow, Straw, Light Yellow    Clarity, UA Clear Clear    Glucose, UA Negative Negative    Protein, UA Negative Negative    Bilirubin, UA Negative Negative    Urobilinogen, UA 0.2 E.U./dL 0.2 E.U./dL, 1.0 E.U./dL    pH, UA 6.0 5.0 - 8.0    Blood, UA Negative Negative    Ketones, UA 40 mg/dL (!) Negative    Nitrite, UA Negative Negative    Leukocytes, UA Negative Negative    Specific Gravity, UA 1.020 1.005 - 1.030   Lactate Dehydrogenase (LDH)   Result Value Ref Range    LD (LDH) 183 125 - 220 U/L   Erythrocyte Sedimentation Rate   Result Value Ref Range    Sed Rate 6 0 - 20 mm/hr   HM1 (CBC with Diff)   Result Value Ref Range    WBC 7.4 4.0 - 11.0 thou/uL    RBC 5.02 3.80 - 5.40 mill/uL    Hemoglobin 15.0 12.0 - 16.0 g/dL    Hematocrit 44.2 35.0 - 47.0 %    MCV 88 80 - 100 fL    MCH 29.9 27.0 - 34.0 pg    MCHC 33.9 32.0 - 36.0 g/dL    RDW  11.6 11.0 - 14.5 %    Platelets 250 140 - 440 thou/uL    MPV 10.0 7.0 - 10.0 fL    Neutrophils % 70 50 - 70 %    Lymphocytes % 24 20 - 40 %    Monocytes % 5 2 - 10 %    Eosinophils % 0 0 - 6 %    Basophils % 0 0 - 2 %    Immature Granulocyte % 0 <=0 %    Neutrophils Absolute 5.2 2.0 - 7.7 thou/uL    Lymphocytes Absolute 1.8 0.8 - 4.4 thou/uL    Monocytes Absolute 0.4 0.0 - 0.9 thou/uL    Eosinophils Absolute 0.0 0.0 - 0.4 thou/uL    Basophils Absolute 0.0 0.0 - 0.2 thou/uL    Immature Granulocyte Absolute 0.0 <=0.0 thou/uL         Labs normal.    Please call with questions or contact us using [employer].    Sincerely,        Electronically signed by Morales Turner MD

## 2021-06-21 NOTE — PROGRESS NOTES
ASSESSMENT/ PLAN    1. Acute sinusitis with symptoms > 10 days  Symptoms consistent with mild case of acute sinusitis based on history and exam. Will give her 5 days of Augmentin. I don't think lab work or imaging is necessary. Encouraged patient to use Netti pot. She couldn't tolerate flonase in the past. rtc if not improving.   - amoxicillin-clavulanate (AUGMENTIN) 875-125 mg per tablet; Take 1 tablet by mouth 2 (two) times a day for 5 days.  Dispense: 10 tablet; Refill: 0          This note was created using Dragon dictation software, spelling errors may occur.     Leelee Butcher MD        SUBJECTIVE   Areli Higginbotham is a 68 y.o. old female with a past medical history of restless leg syndrome, environmental allergies, osteoporosis who presented to clinic today for further evaluation of sinus pressure and ear pain.  She has been given Astelin nasal spray for environmental allergies and was not able to tolerate steroid nasal spray such as Flonase in the past.  For her current symptoms, she reports that they started a little bit over a week ago. She has been sneezing, having a lot of nasal discharge, coughing up phlegm, cannot hear from the left ear (worse than her baseline - she wears hearing aids). Denies fever, N/V.  She was in [facility] for [employer] training. Has been using Afrin. Denies shortness of breath orchest pain.     Review of Systems:  Negative except as noted in HPI    The following portions of the patient's history were reviewed and updated as appropriate: past medical history, past surgical history, family history, allergies, current medications and problem list.    Medical History  Active Ambulatory (Non-Hospital) Problems    Diagnosis     Lumbago     Rectal Polyps     Restless Legs Syndrome     Environmental allergies     Osteoporosis     Chronic Sinusitis     Hearing loss     Past Medical History:   Diagnosis Date     Osteoporosis      Restless legs syndrome (RLS)        Surgical  History  She  has a past surgical history that includes pr biopsy of breast, incisional; pr kendrick w/o facetec foramot/dskc 1/2 vrt seg, lumbar; pr remove total disc arthroplasty, lumbar, single; pr hemorrhoidectomy internal rubber band ligations; pr partial removal of hymen; and pr open tx phalangeal shaft fracture prox/middle ea.    Social History  Reviewed, and she  reports that  has never smoked. she has never used smokeless tobacco. She reports that she drinks alcohol. She reports that she does not use drugs.    Family History  Reviewed, and family history includes Cancer in her mother; Heart disease in her father; Hypertension in her mother; Osteoporosis in her mother.    Medications  Reviewed and reconciled    Allergies  Allergies   Allergen Reactions     Erythromycin Base      upset stomach       Midazolam      Annotation: severe restless leg symptoms           OBJECTIVE  Physical Exam:  Vital signs: /62 (Patient Site: Left Arm, Patient Position: Sitting, Cuff Size: Adult Regular)   Pulse 63   Temp 98  F (36.7  C) (Oral)   Resp 16   Wt 136 lb (61.7 kg)   BMI 24.40 kg/m    Weight: 136 lb (61.7 kg)    General appearance: pleasant, appears stated age, cooperative and in no distress  Eyes: EOMs intact, PERRL, conjunctivae normal.   ENT: moist oral mucosa, posterior oropharynx normal, TMs normal. Sinus mildly tender to palpation but nasal turbinates are very erythematous and edematous with greenish discharge.Thyroid normal to palpation, no lump or mass or tenderness  Lymph: no cervical/supraclavicular adenopathy  Respiratory: clear to auscultation bilaterally, good air movement throughout, no wheezing or crackles, speaking full sentences without difficulty  Cardiovascular: regular rate and rhythm, no murmur appreciated, no leg edema  Skin: no rashes  Neuro: alert oriented x 3, grossly normal otherwise  Psych: normal affect, appropriate conversation

## 2021-06-21 NOTE — LETTER
Letter by Morales Turner MD at      Author: Morales Turner MD Service: -- Author Type: --    Filed:  Encounter Date: 5/10/2021 Status: (Other)         Areli Higginbotham  [address] [facility] [address]             May 10, 2021         Dear Ms. Anahy,    Below are the results from your recent visit:    Resulted Orders   CT Abdomen Pelvis With Oral With IV Contrast    Narrative    EXAM DATE:         05/05/2021    EXAM: CT ABDOMEN, PELVIS WITH CONTRAST  LOCATION: [place] [facility]  DATE/TIME: 5/5/2021 4:30 PM    INDICATION: Abnormal vaginal and uterine bleeding.  COMPARISON: None  TECHNIQUE: CT scan of the abdomen and pelvis was performed following injection of IV contrast. Multiplanar reformats were obtained. Dose reduction techniques were used.  CONTRAST: 100 ml Isovue 370 was administered via iv with 0 ml discarded. Istat CR= 0.7 mg/dl, eGFR=83.    FINDINGS:  LOWER CHEST: Normal.    HEPATOBILIARY: Multiple hepatic cysts. No calcified gallstones or biliary dilatation.    PANCREAS: Normal.    SPLEEN: Normal.    ADRENAL GLANDS: Normal.    KIDNEYS/BLADDER: Symmetric contrast enhancement to both kidneys. Low-attenuation subcentimeter renal lesion(s). These are compatible with small benign cysts and no specific imaging evaluation or follow-up is recommended. No urinary collecting system   dilatation or calculi. Decompressed bladder unremarkable.    BOWEL: Colonic diverticulosis without diverticulitis. Appendix unremarkable. No small bowel dilatation or inflammatory change.    LYMPH NODES: No lymphadenopathy.    VASCULATURE: Normal caliber aorta. Minimal atherosclerotic vascular calcification.    PELVIC ORGANS: No overt uterine or adnexal abnormality. No significant free fluid.    MUSCULOSKELETAL: Minimal degenerative changes in the spine. No suspicious bone lesions.    IMPRESSION:  1.  Allowing for limitations of CT, there is no overt uterine or adnexal abnormality. No  free fluid.    2.  No lymphadenopathy.    3.  Multiple hepatic and renal cysts for which no further follow-up is necessary.    4.  Colonic diverticulosis without diverticulitis.                  No uterine or adnexal abnormality. Normal results.    Please call with questions or contact us using MyChart.    Sincerely,        Electronically signed by Morales Turner MD

## 2021-06-21 NOTE — PROGRESS NOTES
Patient was here for her Prolia injection. Patient's order is  and her ProliaPlus isn't back. She would like to wait and get her injection at [facility] as it is closer to her.

## 2021-06-21 NOTE — PROGRESS NOTES
Last Prolia Injection Date: 11/14/17    1. Have you experienced a new onset of achiness or rashes that lasted for over a month? No  2. Do you feel sick today - fever > 101F, or new deep cough? No  3. Have you have gastric bypass or parathyroid surgery in the last 6 months? No  4. Do you plan any dental implants or extractions in the next 2-3 months? No  5. Have you started any new medications in the last 6 months that you were told could affect your immune system? These may have been prescribed by Oncologist, Transplant Center, Rheumatology or Dermatology. No    Patient is scheduled for next Prolia/Osteo Follow Up.    Patient is overdue by  months for injection. Per Dr. Miller, it is ok to give today.

## 2021-06-21 NOTE — LETTER
Letter by Morales Turner MD at      Author: Morales Turner MD Service: -- Author Type: --    Filed:  Encounter Date: 5/6/2021 Status: (Other)         Areli Higginbotham  [address] [facility] [address] [address]             May 6, 2021         Dear Ms. Higginbotham,    Below are the results from your recent visit:    Resulted Orders   Comprehensive Metabolic Panel   Result Value Ref Range    Sodium 141 136 - 145 mmol/L    Potassium 4.6 3.5 - 5.0 mmol/L    Chloride 102 98 - 107 mmol/L    CO2 27 22 - 31 mmol/L    Anion Gap, Calculation 12 5 - 18 mmol/L    Glucose 114 70 - 125 mg/dL    BUN 14 8 - 28 mg/dL    Creatinine 0.73 0.60 - 1.10 mg/dL    GFR MDRD Af Amer >60 >60 mL/min/1.73m2    GFR MDRD Non Af Amer >60 >60 mL/min/1.73m2    Bilirubin, Total 0.9 0.0 - 1.0 mg/dL    Calcium 9.9 8.5 - 10.5 mg/dL    Protein, Total 7.3 6.0 - 8.0 g/dL    Albumin 4.7 3.5 - 5.0 g/dL    Alkaline Phosphatase 76 45 - 120 U/L    AST 34 0 - 40 U/L    ALT 43 0 - 45 U/L    Narrative    Fasting Glucose reference range is 70-99 mg/dL per  American Diabetes Association (ADA) guidelines.   Urinalysis-UC if Indicated   Result Value Ref Range    Color, UA Yellow Colorless, Yellow, Straw, Light Yellow    Clarity, UA Clear Clear    Glucose, UA Negative Negative    Protein, UA Negative Negative    Bilirubin, UA Negative Negative    Urobilinogen, UA 0.2 E.U./dL 0.2 E.U./dL, 1.0 E.U./dL    pH, UA 6.0 5.0 - 8.0    Blood, UA Negative Negative    Ketones, UA 40 mg/dL (!) Negative    Nitrite, UA Negative Negative    Leukocytes, UA Negative Negative    Specific Gravity, UA 1.020 1.005 - 1.030    Narrative    Microscopic not indicated  UC not indicated   Lactate Dehydrogenase (LDH)   Result Value Ref Range    LD (LDH) 183 125 - 220 U/L   Erythrocyte Sedimentation Rate   Result Value Ref Range    Sed Rate 6 0 - 20 mm/hr   HM1 (CBC with Diff)   Result Value Ref Range    WBC 7.4 4.0 - 11.0 thou/uL    RBC 5.02 3.80 - 5.40 mill/uL    Hemoglobin 15.0 12.0 -  16.0 g/dL    Hematocrit 44.2 35.0 - 47.0 %    MCV 88 80 - 100 fL    MCH 29.9 27.0 - 34.0 pg    MCHC 33.9 32.0 - 36.0 g/dL    RDW 11.6 11.0 - 14.5 %    Platelets 250 140 - 440 thou/uL    MPV 10.0 7.0 - 10.0 fL    Neutrophils % 70 50 - 70 %    Lymphocytes % 24 20 - 40 %    Monocytes % 5 2 - 10 %    Eosinophils % 0 0 - 6 %    Basophils % 0 0 - 2 %    Immature Granulocyte % 0 <=0 %    Neutrophils Absolute 5.2 2.0 - 7.7 thou/uL    Lymphocytes Absolute 1.8 0.8 - 4.4 thou/uL    Monocytes Absolute 0.4 0.0 - 0.9 thou/uL    Eosinophils Absolute 0.0 0.0 - 0.4 thou/uL    Basophils Absolute 0.0 0.0 - 0.2 thou/uL    Immature Granulocyte Absolute 0.0 <=0.0 thou/uL       Normal Results.    Please call with questions or contact us using [employer].    Sincerely,        Electronically signed by Morales Turner MD

## 2021-06-26 ENCOUNTER — HEALTH MAINTENANCE LETTER (OUTPATIENT)
Age: 71
End: 2021-06-26

## 2021-07-13 ASSESSMENT — ACTIVITIES OF DAILY LIVING (ADL): CURRENT_FUNCTION: NO ASSISTANCE NEEDED

## 2021-07-16 ENCOUNTER — MYC MEDICAL ADVICE (OUTPATIENT)
Dept: FAMILY MEDICINE | Facility: CLINIC | Age: 71
End: 2021-07-16

## 2021-07-16 ENCOUNTER — OFFICE VISIT (OUTPATIENT)
Dept: FAMILY MEDICINE | Facility: CLINIC | Age: 71
End: 2021-07-16
Payer: COMMERCIAL

## 2021-07-16 VITALS
WEIGHT: 135.8 LBS | HEIGHT: 63 IN | SYSTOLIC BLOOD PRESSURE: 133 MMHG | HEART RATE: 83 BPM | RESPIRATION RATE: 20 BRPM | DIASTOLIC BLOOD PRESSURE: 57 MMHG | BODY MASS INDEX: 24.06 KG/M2

## 2021-07-16 DIAGNOSIS — E78.5 HYPERLIPIDEMIA, UNSPECIFIED HYPERLIPIDEMIA TYPE: ICD-10-CM

## 2021-07-16 DIAGNOSIS — M81.0 OSTEOPOROSIS, UNSPECIFIED OSTEOPOROSIS TYPE, UNSPECIFIED PATHOLOGICAL FRACTURE PRESENCE: ICD-10-CM

## 2021-07-16 DIAGNOSIS — G25.81 RESTLESS LEGS SYNDROME (RLS): ICD-10-CM

## 2021-07-16 DIAGNOSIS — Z00.00 ENCOUNTER FOR MEDICARE ANNUAL WELLNESS EXAM: Primary | ICD-10-CM

## 2021-07-16 DIAGNOSIS — F51.01 PRIMARY INSOMNIA: ICD-10-CM

## 2021-07-16 LAB
ALBUMIN SERPL-MCNC: 4.4 G/DL (ref 3.5–5)
ALP SERPL-CCNC: 90 U/L (ref 45–120)
ALT SERPL W P-5'-P-CCNC: 39 U/L (ref 0–45)
ANION GAP SERPL CALCULATED.3IONS-SCNC: 12 MMOL/L (ref 5–18)
AST SERPL W P-5'-P-CCNC: 34 U/L (ref 0–40)
BILIRUB SERPL-MCNC: 0.5 MG/DL (ref 0–1)
BUN SERPL-MCNC: 9 MG/DL (ref 8–28)
CALCIUM SERPL-MCNC: 9.7 MG/DL (ref 8.5–10.5)
CHLORIDE BLD-SCNC: 99 MMOL/L (ref 98–107)
CHOLEST SERPL-MCNC: 210 MG/DL
CO2 SERPL-SCNC: 28 MMOL/L (ref 22–31)
CREAT SERPL-MCNC: 0.71 MG/DL (ref 0.6–1.1)
ERYTHROCYTE [DISTWIDTH] IN BLOOD BY AUTOMATED COUNT: 11.7 % (ref 10–15)
FASTING STATUS PATIENT QL REPORTED: YES
GFR SERPL CREATININE-BSD FRML MDRD: 87 ML/MIN/1.73M2
GLUCOSE BLD-MCNC: 100 MG/DL (ref 70–125)
HCT VFR BLD AUTO: 48.3 % (ref 35–47)
HDLC SERPL-MCNC: 83 MG/DL
HGB BLD-MCNC: 15.9 G/DL (ref 11.7–15.7)
LDLC SERPL CALC-MCNC: 106 MG/DL
MCH RBC QN AUTO: 29.3 PG (ref 26.5–33)
MCHC RBC AUTO-ENTMCNC: 32.9 G/DL (ref 31.5–36.5)
MCV RBC AUTO: 89 FL (ref 78–100)
PLATELET # BLD AUTO: 184 10E3/UL (ref 150–450)
POTASSIUM BLD-SCNC: 4.5 MMOL/L (ref 3.5–5)
PROT SERPL-MCNC: 7.2 G/DL (ref 6–8)
RBC # BLD AUTO: 5.43 10E6/UL (ref 3.8–5.2)
SODIUM SERPL-SCNC: 139 MMOL/L (ref 136–145)
TRIGL SERPL-MCNC: 105 MG/DL
WBC # BLD AUTO: 4.8 10E3/UL (ref 4–11)

## 2021-07-16 PROCEDURE — 85027 COMPLETE CBC AUTOMATED: CPT | Performed by: FAMILY MEDICINE

## 2021-07-16 PROCEDURE — 99213 OFFICE O/P EST LOW 20 MIN: CPT | Mod: 25 | Performed by: FAMILY MEDICINE

## 2021-07-16 PROCEDURE — 80061 LIPID PANEL: CPT | Performed by: FAMILY MEDICINE

## 2021-07-16 PROCEDURE — 36415 COLL VENOUS BLD VENIPUNCTURE: CPT | Performed by: FAMILY MEDICINE

## 2021-07-16 PROCEDURE — 99397 PER PM REEVAL EST PAT 65+ YR: CPT | Performed by: FAMILY MEDICINE

## 2021-07-16 PROCEDURE — 80053 COMPREHEN METABOLIC PANEL: CPT | Performed by: FAMILY MEDICINE

## 2021-07-16 ASSESSMENT — MIFFLIN-ST. JEOR: SCORE: 1097.17

## 2021-07-16 ASSESSMENT — ACTIVITIES OF DAILY LIVING (ADL): CURRENT_FUNCTION: NO ASSISTANCE NEEDED

## 2021-07-16 NOTE — PATIENT INSTRUCTIONS
Please see dermatology for annual skin check.    Patient Education   Personalized Prevention Plan  You are due for the preventive services outlined below.  Your care team is available to assist you in scheduling these services.  If you have already completed any of these items, please share that information with your care team to update in your medical record.  Health Maintenance Due   Topic Date Due     ANNUAL REVIEW OF HM ORDERS  Never done     FALL RISK ASSESSMENT  07/13/2021     Preventive Health Recommendations    See your health care provider every year to    Review health changes.     Discuss preventive care.      Review your medicines if your doctor has prescribed any.    You no longer need a yearly Pap test unless you've had an abnormal Pap test in the past 10 years. If you have vaginal symptoms, such as bleeding or discharge, be sure to talk with your provider about a Pap test.    Every 1 to 2 years, have a mammogram.  If you are over 69, talk with your health care provider about whether or not you want to continue having screening mammograms.    Every 10 years, have a colonoscopy. Or, have a yearly FIT test (stool test). These exams will check for colon cancer.     Have a cholesterol test every 5 years, or more often if your doctor advises it.     Have a diabetes test (fasting glucose) every three years. If you are at risk for diabetes, you should have this test more often.     At age 65, have a bone density scan (DEXA) to check for osteoporosis (brittle bone disease).    Shots:    Get a flu shot each year.    Get a tetanus shot every 10 years.    Talk to your doctor about your pneumonia vaccines. There are now two you should receive - Pneumovax (PPSV 23) and Prevnar (PCV 13).    Talk to your pharmacist about the shingles vaccine.    Talk to your doctor about the hepatitis B vaccine.    Nutrition:     Eat at least 5 servings of fruits and vegetables each day.    Eat whole-grain bread, whole-wheat pasta  and brown rice instead of white grains and rice.    Get adequate Calcium and Vitamin D.     Lifestyle    Exercise at least 150 minutes a week (30 minutes a day, 5 days a week). This will help you control your weight and prevent disease.    Limit alcohol to one drink per day.    No smoking.     Wear sunscreen to prevent skin cancer.     See your dentist twice a year for an exam and cleaning.    See your eye doctor every 1 to 2 years to screen for conditions such as glaucoma, macular degeneration and cataracts.    Personalized Prevention Plan  You are due for the preventive services outlined below.  Your care team is available to assist you in scheduling these services.  If you have already completed any of these items, please share that information with your care team to update in your medical record.  Health Maintenance   Topic Date Due     ANNUAL REVIEW OF HM ORDERS  Never done     FALL RISK ASSESSMENT  07/13/2021     INFLUENZA VACCINE (1) 09/01/2021     DTAP/TDAP/TD IMMUNIZATION (2 - Td or Tdap) 05/04/2022     MEDICARE ANNUAL WELLNESS VISIT  07/16/2022     MAMMO SCREENING  12/24/2022     LIPID  07/29/2025     ADVANCE CARE PLANNING  07/16/2026     COLORECTAL CANCER SCREENING  09/08/2027     DEXA  11/20/2034     HEPATITIS C SCREENING  Completed     PHQ-2  Completed     Pneumococcal Vaccine: 65+ Years  Completed     ZOSTER IMMUNIZATION  Completed     COVID-19 Vaccine  Completed     IPV IMMUNIZATION  Aged Out     MENINGITIS IMMUNIZATION  Aged Out     HEPATITIS B IMMUNIZATION  Aged Out       Signs of Hearing Loss      Hearing much better with one ear can be a sign of hearing loss.   Hearing loss is a problem shared by many people. In fact, it is one of the most common health problems, particularly as people age. Most people age 65 and older have some hearing loss. By age 80, almost everyone does. Hearing loss often occurs slowly over the years. So you may not realize your hearing has gotten worse.  Have your hearing  checked  Call your healthcare provider if you:    Have to strain to hear normal conversation    Have to watch other people s faces very carefully to follow what they re saying    Need to ask people to repeat what they ve said    Often misunderstand what people are saying    Turn the volume of the television or radio up so high that others complain    Feel that people are mumbling when they re talking to you    Find that the effort to hear leaves you feeling tired and irritated    Notice, when using the phone, that you hear better with one ear than the other  [employer] last reviewed this educational content on 1/1/2020 2000-2021 [employer]. All rights reserved. This information is not intended as a substitute for professional medical care. Always follow your healthcare professional's instructions.

## 2021-07-16 NOTE — PROGRESS NOTES
"SUBJECTIVE:   Areli Higginbotham is a 70 year old female who presents for Preventive Visit.    Chief Complaint   Patient presents with     Wellness Visit     Fasting labs     Patient has been advised of split billing requirements and indicates understanding: Yes   Are you in the first 12 months of your Medicare coverage?  No    Healthy Habits:     In general, how would you rate your overall health?  Excellent    Frequency of exercise:  4-5 days/week    Duration of exercise:  30-45 minutes    Do you usually eat at least 4 servings of fruit and vegetables a day, include whole grains    & fiber and avoid regularly eating high fat or \"junk\" foods?  Yes    Taking medications regularly:  Yes    Medication side effects:  None    Ability to successfully perform activities of daily living:  No assistance needed    Home Safety:  Lack of grab bars in the bathroom    Hearing Impairment:  Difficulty following a conversation in a noisy restaurant or crowded room, difficulty following dialogue in the theater, need to ask people to speak up or repeat themselves and difficulty understanding soft or whispered speech    In the past 6 months, have you been bothered by leaking of urine?  No    In general, how would you rate your overall mental or emotional health?  Excellent      PHQ-2 Total Score: 0    Additional concerns today:  Yes      Today she's concerned about \"acidic\" feeling in her throat around 2-3 AM in the morning. About a couple times a week. TUMS help. No other symptoms.  She's been experiencing a lot of stress. She's working on putting in a fence and this stresses her out. She's been gardening and having some low back pain. Tylenol helps. Has a headache almost daily but tylenol will help this.   Had colonoscopy in 2017, repeat in 5 years.  Has concern about sleep. She cannot sleep. She doesn't watch screen for 45 mins before bed, tried tea, tried melatonin and unisom. She goes to bed at 9, no problem falling asleep. Then " wakes up at 1 am and cannot go back to sleep.     Do you feel safe in your environment? Yes    Have you ever done Advance Care Planning? (For example, a Health Directive, POLST, or a discussion with a medical provider or your loved ones about your wishes): Yes, advance care planning is on file.      Fall risk  Fallen 2 or more times in the past year?: No  Any fall with injury in the past year?: No      Cognitive Screening   1) Repeat 3 items (Leader, Season, Table)    2) Clock draw: NORMAL  3) 3 item recall: Recalls 3 objects  Results: 3 items recalled: COGNITIVE IMPAIRMENT LESS LIKELY    Mini-CogTM Copyright [employer]. Licensed by the author for use in [facility]; reprinted with permission (linda@[facility]). All rights reserved.      Do you have sleep apnea, excessive snoring or daytime drowsiness?: no    Reviewed and updated as needed this visit by clinical staff  Tobacco  Allergies  Meds  Problems  Med Hx  Surg Hx  Fam Hx          Reviewed and updated as needed this visit by Provider  Tobacco  Allergies  Meds  Problems  Med Hx  Surg Hx  Fam Hx         Social History     Tobacco Use     Smoking status: Never Smoker     Smokeless tobacco: Never Used   Substance Use Topics     Alcohol use: Yes     Comment: Alcoholic Drinks/day: 2 drinks/week     If you drink alcohol do you typically have >3 drinks per day or >7 drinks per week? No    Alcohol Use 7/13/2021   Prescreen: >3 drinks/day or >7 drinks/week? No       Current providers sharing in care for this patient include:   No care team member to display    The following health maintenance items are reviewed in Epic and correct as of today:  Health Maintenance Due   Topic Date Due     ANNUAL REVIEW OF HM ORDERS  Never done     FALL RISK ASSESSMENT  07/13/2021     MEDICARE ANNUAL WELLNESS VISIT  07/13/2021     Lab work is in process      Review of Systems  Constitutional, HEENT, cardiovascular, pulmonary, GI, , musculoskeletal, neuro, skin,  "endocrine and psych systems are negative, except as otherwise noted.    OBJECTIVE:   Ht 1.588 m (5' 2.5\")   Wt 61.6 kg (135 lb 12.8 oz)   BMI 24.44 kg/m   Estimated body mass index is 24.44 kg/m  as calculated from the following:    Height as of this encounter: 1.588 m (5' 2.5\").    Weight as of this encounter: 61.6 kg (135 lb 12.8 oz).  Physical Exam  GENERAL APPEARANCE: healthy, alert and no distress  EYES: Eyes grossly normal to inspection, PERRL and conjunctivae and sclerae normal  HENT: ear canals and TM's normal, nose and mouth without ulcers or lesions, oropharynx clear and oral mucous membranes moist  NECK: no adenopathy, no asymmetry, masses, or scars and thyroid normal to palpation  RESP: lungs clear to auscultation - no rales, rhonchi or wheezes  CV: regular rate and rhythm, normal S1 S2, no S3 or S4, no murmur, click or rub, no peripheral edema and peripheral pulses strong  ABDOMEN: soft, nontender, no hepatosplenomegaly, no masses and bowel sounds normal  MS: no musculoskeletal defects are noted and gait is age appropriate without ataxia  SKIN: no suspicious lesions or rashes  NEURO: Normal strength and tone, sensory exam grossly normal, mentation intact and speech normal  PSYCH: mentation appears normal and affect normal/bright    Diagnostic Test Results:  Labs reviewed in Epic    ASSESSMENT / PLAN:   Areli was seen today for wellness visit.    Diagnoses and all orders for this visit:    Encounter for Medicare annual wellness exam  Routine history and physical, updated in EMR.  Patient is up-to-date with mammogram and colonoscopy which was done in 2017 and repeat in 5 years.  She sees endocrinology for osteoporosis and is on Prolia injection.  She is up-to-date with immunization.  Return in a year for next annual physical and fasting lab work.    Restless Legs Syndrome  On mirapex. No concern today.   -     Comprehensive metabolic panel (BMP + Alb, Alk Phos, ALT, AST, Total. Bili, TP); Future  -   " "  CBC with platelets; Future  -     Comprehensive metabolic panel (BMP + Alb, Alk Phos, ALT, AST, Total. Bili, TP)  -     CBC with platelets    Osteoporosis, unspecified osteoporosis type, unspecified pathological fracture presence  -     Comprehensive metabolic panel (BMP + Alb, Alk Phos, ALT, AST, Total. Bili, TP); Future  -     Comprehensive metabolic panel (BMP + Alb, Alk Phos, ALT, AST, Total. Bili, TP)    Hyperlipidemia, unspecified hyperlipidemia type  On pravastatin. Continues. Lab today.  -     Lipid panel; Future  -     Lipid panel    Primary insomnia  She has no trouble falling asleep.  She goes to sleep around 9 PM what wake up at 1 or 2 AM and cannot return to sleep.  Advised patient to take melatonin in early morning when she wakes up hopefully give her a few more hours of sleep.    Other orders  -     REVIEW OF HEALTH MAINTENANCE PROTOCOL ORDERS        Patient has been advised of split billing requirements and indicates understanding: Yes  COUNSELING:  Reviewed preventive health counseling, as reflected in patient instructions       Regular exercise       Healthy diet/nutrition       Vision screening       Hearing screening       Dental care       Fall risk prevention       Osteoporosis prevention/bone health       Colon cancer screening       Advanced Planning     Estimated body mass index is 24.44 kg/m  as calculated from the following:    Height as of this encounter: 1.588 m (5' 2.5\").    Weight as of this encounter: 61.6 kg (135 lb 12.8 oz).      She reports that she has never smoked. She has never used smokeless tobacco.      Appropriate preventive services were discussed with this patient, including applicable screening as appropriate for cardiovascular disease, diabetes, osteopenia/osteoporosis, and glaucoma.  As appropriate for age/gender, discussed screening for colorectal cancer, prostate cancer, breast cancer, and cervical cancer. Checklist reviewing preventive services available has been " given to the patient.    Reviewed patients plan of care and provided an AVS. The Basic Care Plan (routine screening as documented in Health Maintenance) for Areli meets the Care Plan requirement. This Care Plan has been established and reviewed with the Patient.    Counseling Resources:  ATP IV Guidelines  Pooled Cohorts Equation Calculator  Breast Cancer Risk Calculator  Breast Cancer: Medication to Reduce Risk  FRAX Risk Assessment  ICSI Preventive Guidelines  Dietary Guidelines for Americans, 2010  [employer] MyPlate  ASA Prophylaxis  Lung CA Screening    Leelee Butcher MD  [facility]    Identified Health Risks:

## 2021-07-19 RX ORDER — PRAVASTATIN SODIUM 40 MG
40 TABLET ORAL AT BEDTIME
Qty: 90 TABLET | Refills: 1 | Status: SHIPPED | OUTPATIENT
Start: 2021-07-19 | End: 2022-01-26

## 2021-07-19 RX ORDER — PRAMIPEXOLE DIHYDROCHLORIDE 0.25 MG/1
TABLET ORAL
Qty: 180 TABLET | Refills: 1 | Status: SHIPPED | OUTPATIENT
Start: 2021-07-19 | End: 2021-12-30

## 2021-07-21 RX ORDER — PRAMIPEXOLE DIHYDROCHLORIDE 0.25 MG/1
TABLET ORAL
Qty: 180 TABLET | Refills: 1 | OUTPATIENT
Start: 2021-07-21

## 2021-07-22 NOTE — TELEPHONE ENCOUNTER
Refill request already responded to.  Pramipexole filled 7/19/2021 #180 tablets with 1 refill.    Chantal Solano, CHRISTINA  Triage Nurse Advisor

## 2021-10-16 ENCOUNTER — HEALTH MAINTENANCE LETTER (OUTPATIENT)
Age: 71
End: 2021-10-16

## 2021-10-19 ENCOUNTER — IMMUNIZATION (OUTPATIENT)
Dept: FAMILY MEDICINE | Facility: CLINIC | Age: 71
End: 2021-10-19
Payer: COMMERCIAL

## 2021-10-19 PROCEDURE — G0008 ADMIN INFLUENZA VIRUS VAC: HCPCS

## 2021-10-19 PROCEDURE — 90662 IIV NO PRSV INCREASED AG IM: CPT

## 2021-12-07 DIAGNOSIS — M81.0 OSTEOPOROSIS, UNSPECIFIED OSTEOPOROSIS TYPE, UNSPECIFIED PATHOLOGICAL FRACTURE PRESENCE: Primary | ICD-10-CM

## 2021-12-07 DIAGNOSIS — Z92.29 PERSONAL HISTORY OF OTHER DRUG THERAPY: ICD-10-CM

## 2021-12-08 ENCOUNTER — TELEPHONE (OUTPATIENT)
Dept: INTERNAL MEDICINE | Facility: CLINIC | Age: 71
End: 2021-12-08
Payer: COMMERCIAL

## 2021-12-08 NOTE — TELEPHONE ENCOUNTER
She had an appointment with Dr. Miller for today and had to be reschedule due to provider being out of the office. She rescheduled the appointment and will be in to see Dr. Miller on Tuesday 02.01.2022.     She was supposed to discuss continuing on Prolia today or go on a drug holiday, and since her appointment isn't until Feb 2022, she will need to know what to do in the mean time.     Please advise and call the patient.

## 2021-12-09 NOTE — TELEPHONE ENCOUNTER
Patient notified and scheduled for 1/6/2022.  Quynh Dexter CMA ............... 4:35 PM, 12/09/21

## 2021-12-09 NOTE — TELEPHONE ENCOUNTER
"I did not see this patient for 2 years, last visit was in 11/2019. I was able to find the dates of Prolia injection with the nurse on 5/2020, 12/2020, but no Prolia injection in June 2021. If that's true, she missed her Prolia dose this last summer. I never recommend \"drug holiday\" from Prolia. Prolia should never be suddenly stopped. If for any reason we want to stop Prolia, we should switch to a different medication. She also did not have DXA scan since Nov 2019.   I have openings in January if she wants to be seen earlier. You can use 10 min slot or same day. But, she will have to complete the DXA scan before she comes to see me.  "

## 2021-12-30 DIAGNOSIS — G25.81 RESTLESS LEGS SYNDROME (RLS): ICD-10-CM

## 2021-12-30 RX ORDER — PRAMIPEXOLE DIHYDROCHLORIDE 0.25 MG/1
TABLET ORAL
Qty: 180 TABLET | Refills: 1 | Status: SHIPPED | OUTPATIENT
Start: 2021-12-30 | End: 2022-07-08

## 2022-01-04 ENCOUNTER — TRANSFERRED RECORDS (OUTPATIENT)
Dept: HEALTH INFORMATION MANAGEMENT | Facility: CLINIC | Age: 72
End: 2022-01-04
Payer: COMMERCIAL

## 2022-01-06 ENCOUNTER — ANCILLARY PROCEDURE (OUTPATIENT)
Dept: BONE DENSITY | Facility: CLINIC | Age: 72
End: 2022-01-06
Payer: COMMERCIAL

## 2022-01-06 ENCOUNTER — OFFICE VISIT (OUTPATIENT)
Dept: INTERNAL MEDICINE | Facility: CLINIC | Age: 72
End: 2022-01-06
Payer: COMMERCIAL

## 2022-01-06 VITALS
DIASTOLIC BLOOD PRESSURE: 66 MMHG | OXYGEN SATURATION: 98 % | SYSTOLIC BLOOD PRESSURE: 121 MMHG | HEART RATE: 61 BPM | BODY MASS INDEX: 24.87 KG/M2 | WEIGHT: 138.2 LBS

## 2022-01-06 DIAGNOSIS — Z87.39 HISTORY OF OSTEONECROSIS: ICD-10-CM

## 2022-01-06 DIAGNOSIS — Z78.0 POSTMENOPAUSAL: ICD-10-CM

## 2022-01-06 DIAGNOSIS — M81.0 OSTEOPOROSIS, UNSPECIFIED OSTEOPOROSIS TYPE, UNSPECIFIED PATHOLOGICAL FRACTURE PRESENCE: Primary | ICD-10-CM

## 2022-01-06 PROCEDURE — 96372 THER/PROPH/DIAG INJ SC/IM: CPT | Performed by: INTERNAL MEDICINE

## 2022-01-06 PROCEDURE — 77080 DXA BONE DENSITY AXIAL: CPT | Mod: TC | Performed by: RADIOLOGY

## 2022-01-06 PROCEDURE — 99213 OFFICE O/P EST LOW 20 MIN: CPT | Mod: 25 | Performed by: INTERNAL MEDICINE

## 2022-01-06 NOTE — PROGRESS NOTES
(M81.0) Osteoporosis, unspecified osteoporosis type, unspecified pathological fracture presence  (primary encounter diagnosis)  She was treated in the past with:  1. Fosamax 7503-9327  2. Boniva 2042-2666  3. Forteo 5730-7326  4. Prolia 6145-3994, 15 doses, last one in December 2020. She missed the dose in June 2021.    She had Prolia stopped in Dec 2014 because of the ONJ. Prolia was restarted in 10/2016.  She would like to continue with the Prolia and have a break of 6 months every 2-2-1/2 years.    (Z87.39) History of osteonecrosis  Comment: Jaw in 2014.  She would like to continue with the Prolia and have a break of 6 months every 2-2-1/2 years.  We discussed high risk of rebound vertebral fractures when Prolia suddenly stopped.  We discussed everything and she still would like to have a break in her Prolia treatment in the future, since that was recommended by her oral surgeon.    Patient was educated on safety of Prolia utilizing Patient Counseling Chart for Healthcare Providers, as outlined by the Prolia REMS progam.     Return in about 6 months (around 7/6/2022) for Prolia with CSS.    Patient Instructions   Prolia 16th today.  Prolia 17th in 6 months with my nurse. I will see you in 1 year.    DXA in 2 year .   Phone number to schedule [phone].    Daily calcium need is 1972-0656 mg a day from the diet and supplements.  Calcium citrate is easier to digest.  Vitamin D 2000 IU daily recommended.    We can switch to Evenity in the future. Let me know if you would like to try this medication.          /66   Pulse 61   Wt 62.7 kg (138 lb 3.2 oz)   SpO2 98%   BMI 24.87 kg/m        Did you experience any problems with previous Prolia injection? no  Any medication change in the last 6 months? no  Did you take prednisone or other immunosupressant drugs in the last 6 months   (chemo, transplant, rheum, dermatology conditions)? no  Did you have any serious infection in the last 6 months?no  Any recent  hospitalizations?no  Do you plan any dental work in the next 2-3 months?no  How much calcium do you take daily from the diet and supplements?1200 mg  How much vit D do you take daily? 2000 IU  Last DXA? Done today,  Reviewed and discussed      Patient is here today for the 16th Prolia injection. Patient tolerated previous injections well.   We discussed calcium and vit D daily needs today.   I reviewed the lab results:  Component      Latest Ref Rng & Units 7/16/2021   Sodium      136 - 145 mmol/L 139   Potassium      3.5 - 5.0 mmol/L 4.5   Chloride      98 - 107 mmol/L 99   Carbon Dioxide      22 - 31 mmol/L 28   Anion Gap      5 - 18 mmol/L 12   Urea Nitrogen      8 - 28 mg/dL 9   Creatinine      0.60 - 1.10 mg/dL 0.71   Calcium      8.5 - 10.5 mg/dL 9.7   Glucose      70 - 125 mg/dL 100   Alkaline Phosphatase      45 - 120 U/L 90   AST      0 - 40 U/L 34   ALT      0 - 45 U/L 39   Protein Total      6.0 - 8.0 g/dL 7.2   Albumin      3.5 - 5.0 g/dL 4.4   Bilirubin Total      0.0 - 1.0 mg/dL 0.5   GFR Estimate      >60 mL/min/1.73m2 87         Next Prolia injection will be in 6 months.           This note has been dictated using voice recognition software. Any grammatical or context distortions are unintentional and inherent to the software      Patient Active Problem List   Diagnosis     Rectal Polyps     Restless Legs Syndrome     Environmental allergies     Osteoporosis     Chronic Sinusitis     Hearing loss     Lumbago     Bilateral sensorineural hearing loss     Personal history of other drug therapy     Hyperlipidemia, unspecified hyperlipidemia type     History of osteonecrosis       Current Outpatient Medications   Medication     acetaminophen (TYLENOL) 325 MG tablet     ascorbic acid (VITAMIN C) 1000 MG tablet     azelastine (ASTELIN) 137 mcg (0.1 %) nasal spray     CALCIUM ORAL     cetirizine (ZYRTEC) 10 MG tablet     cholecalciferol, vitamin D3, (VITAMIN D3) 2,000 unit cap     denosumab (PROLIA) 60 MG/ML  SOSY injection     pramipexole (MIRAPEX) 0.25 MG tablet     pravastatin (PRAVACHOL) 40 MG tablet     VIT C/VIT E AC/LUT/COPPER/ZINC (PRESERVISION LUTEIN ORAL)     Current Facility-Administered Medications   Medication     denosumab (PROLIA) injection 60 mg

## 2022-01-06 NOTE — PATIENT INSTRUCTIONS
Prolia 16th today.  Prolia 17th in 6 months with my nurse. I will see you in 1 year.    DXA in 2 year .   Phone number to schedule [phone].    Daily calcium need is 3008-8293 mg a day from the diet and supplements.  Calcium citrate is easier to digest.  Vitamin D 2000 IU daily recommended.    We can switch to Evenity in the future. Let me know if you would like to try this medication.

## 2022-01-25 DIAGNOSIS — E78.5 HYPERLIPIDEMIA, UNSPECIFIED HYPERLIPIDEMIA TYPE: ICD-10-CM

## 2022-01-26 RX ORDER — PRAVASTATIN SODIUM 40 MG
TABLET ORAL
Qty: 90 TABLET | Refills: 1 | Status: SHIPPED | OUTPATIENT
Start: 2022-01-26 | End: 2022-04-25

## 2022-01-26 NOTE — TELEPHONE ENCOUNTER
"Last Written Prescription Date:  7/19/21  Last Fill Quantity: 90,  # refills: 1   Last office visit provider:  1/6/22     Requested Prescriptions   Pending Prescriptions Disp Refills     pravastatin (PRAVACHOL) 40 MG tablet [Pharmacy Med Name: PRAVASTATIN SODIUM 40 MG TAB] 90 tablet 1     Sig: TAKE 1 TABLET BY MOUTH AT BEDTIME       Statins Protocol Passed - 1/25/2022 12:31 AM        Passed - LDL on file in past 12 months     Recent Labs   Lab Test 07/16/21  0936                Passed - No abnormal creatine kinase in past 12 months     No lab results found.             Passed - Recent (12 mo) or future (30 days) visit within the authorizing provider's specialty     Patient has had an office visit with the authorizing provider or a provider within the authorizing providers department within the previous 12 mos or has a future within next 30 days. See \"Patient Info\" tab in inbasket, or \"Choose Columns\" in Meds & Orders section of the refill encounter.              Passed - Medication is active on med list        Passed - Patient is age 18 or older        Passed - No active pregnancy on record        Passed - No positive pregnancy test in past 12 months             Jeremy Harrison RN 01/26/22 1:32 PM  "

## 2022-01-27 ENCOUNTER — LAB (OUTPATIENT)
Dept: LAB | Facility: CLINIC | Age: 72
End: 2022-01-27
Attending: FAMILY MEDICINE
Payer: COMMERCIAL

## 2022-01-27 DIAGNOSIS — Z20.822 CLOSE EXPOSURE TO 2019 NOVEL CORONAVIRUS: ICD-10-CM

## 2022-01-27 PROCEDURE — U0005 INFEC AGEN DETEC AMPLI PROBE: HCPCS | Mod: 90

## 2022-01-27 PROCEDURE — U0003 INFECTIOUS AGENT DETECTION BY NUCLEIC ACID (DNA OR RNA); SEVERE ACUTE RESPIRATORY SYNDROME CORONAVIRUS 2 (SARS-COV-2) (CORONAVIRUS DISEASE [COVID-19]), AMPLIFIED PROBE TECHNIQUE, MAKING USE OF HIGH THROUGHPUT TECHNOLOGIES AS DESCRIBED BY CMS-2020-01-R: HCPCS | Mod: 90

## 2022-01-27 PROCEDURE — 99000 SPECIMEN HANDLING OFFICE-LAB: CPT

## 2022-01-28 LAB — SARS-COV-2 RNA RESP QL NAA+PROBE: NOT DETECTED

## 2022-04-05 ENCOUNTER — NURSE TRIAGE (OUTPATIENT)
Dept: NURSING | Facility: CLINIC | Age: 72
End: 2022-04-05
Payer: COMMERCIAL

## 2022-04-05 ENCOUNTER — OFFICE VISIT (OUTPATIENT)
Dept: FAMILY MEDICINE | Facility: CLINIC | Age: 72
End: 2022-04-05
Payer: COMMERCIAL

## 2022-04-05 ENCOUNTER — TELEPHONE (OUTPATIENT)
Dept: FAMILY MEDICINE | Facility: CLINIC | Age: 72
End: 2022-04-05

## 2022-04-05 VITALS
HEART RATE: 60 BPM | RESPIRATION RATE: 16 BRPM | OXYGEN SATURATION: 99 % | SYSTOLIC BLOOD PRESSURE: 162 MMHG | DIASTOLIC BLOOD PRESSURE: 78 MMHG

## 2022-04-05 DIAGNOSIS — R20.1 HYPOESTHESIA: ICD-10-CM

## 2022-04-05 DIAGNOSIS — R06.2 WHEEZES: Primary | ICD-10-CM

## 2022-04-05 LAB
ERYTHROCYTE [DISTWIDTH] IN BLOOD BY AUTOMATED COUNT: 11.5 % (ref 10–15)
ERYTHROCYTE [SEDIMENTATION RATE] IN BLOOD BY WESTERGREN METHOD: 5 MM/HR (ref 0–20)
HCT VFR BLD AUTO: 45.9 % (ref 35–47)
HGB BLD-MCNC: 15.4 G/DL (ref 11.7–15.7)
MCH RBC QN AUTO: 29.7 PG (ref 26.5–33)
MCHC RBC AUTO-ENTMCNC: 33.6 G/DL (ref 31.5–36.5)
MCV RBC AUTO: 89 FL (ref 78–100)
PLATELET # BLD AUTO: 260 10E3/UL (ref 150–450)
RBC # BLD AUTO: 5.18 10E6/UL (ref 3.8–5.2)
WBC # BLD AUTO: 8 10E3/UL (ref 4–11)

## 2022-04-05 PROCEDURE — 99214 OFFICE O/P EST MOD 30 MIN: CPT | Performed by: PHYSICIAN ASSISTANT

## 2022-04-05 PROCEDURE — 85027 COMPLETE CBC AUTOMATED: CPT | Performed by: PHYSICIAN ASSISTANT

## 2022-04-05 PROCEDURE — 85652 RBC SED RATE AUTOMATED: CPT | Performed by: PHYSICIAN ASSISTANT

## 2022-04-05 PROCEDURE — 36415 COLL VENOUS BLD VENIPUNCTURE: CPT | Performed by: PHYSICIAN ASSISTANT

## 2022-04-05 NOTE — TELEPHONE ENCOUNTER
Reason for call:  Patient reporting a symptom    Symptom or request: rt side facial numbness and tingling, jaw to eye numbness, comes and goes    Duration (how long have symptoms been present): 3-4 minutes on 3/26    Have you been treated for this before? No    Additional comments: numbness and tingling is on right side of face.  Doesn't move to left side.  Transferred to triage nurse    Phone Number patient can be reached at:  Cell number on file:    Telephone Information:   Mobile [phone]       Best Time:  any    Can we leave a detailed message on this number:  YES    Call taken on 4/5/2022 at 3:57 PM by LYNN BAKER  [phone]

## 2022-04-05 NOTE — PROGRESS NOTES
Patient presents with:  Numbness: Numbness on R side of face and R hand       Clinical Decision Making: Patient experiencing tingling sensation.  Symptoms are slowly improving.  No current focal neurologic deficits.  Consider possibility for temporal arteritis shingles, Bell's palsy, trigeminal neuralgia, CVA, TIA.  Given the duration of symptoms and lack of neurologic deficits and improvement of symptoms I recommend small work-up today.  ESR was normal and patient has not been experiencing any vision changes or head pain, so a low suspicion for temporal arteritis.  No physical exam consistent with Bell's palsy.  No rash present concerning for shingles.  CBC was WNL today.  Recommend watchful waiting.    Patient had incidental finding of wheezing.  Chest x-ray was negative.  Patient not significantly bothered or noticing the wheeze.      ICD-10-CM    1. Wheezes  R06.2 XR Chest 2 Views     XR Chest 2 Views   2. Hypoesthesia  R20.1 ESR: Erythrocyte sedimentation rate     CBC with platelets     ESR: Erythrocyte sedimentation rate     CBC with platelets       Patient Instructions   1.  I will call you with your lab results when they are available.  2.  If you are labs and x-ray are normal please follow-up with primary care if your tingling sensation is not improving over the course the next 1 week.  3.  Seek emergency medical attention if you develop head pain, vision changes, weakness, dizziness, or high fever.      HPI:  Areli Higginbotham is a 71 year old female who presents today complaining of tingling numbness sensation that suddenly occurred on her right hand and the right side of her face 10 days ago.  She denied any other associated symptoms such as slurred speech, head pain, vision changes, weakness, hearing changes, dizziness, balance issues, rashes.  She denies any recent travel.  She denies any cough, shortness of breath, or chest pain.  She denies any previous history of migraines.  She did not see any  facial asymmetry with the symptom.  She describes the symptom as a sensation of when Novocain is wearing off.  She is still aware that something is touching her, but it feels cool and tingly.  Her symptoms have slowly been improving over the past 10 days.    History obtained from the patient.    Problem List:  2022-01: History of osteonecrosis  2021-07: Hyperlipidemia, unspecified hyperlipidemia type  2019-11: Personal history of other drug therapy  2019-05: Bilateral sensorineural hearing loss  2015-09: Lumbago  Rectal Polyps  Restless Legs Syndrome  Environmental allergies  Osteoporosis  Chronic Sinusitis  Hearing loss  Hypercholesterolemia      Past Medical History:   Diagnosis Date     Osteoporosis      Restless legs syndrome (RLS)        Social History     Tobacco Use     Smoking status: Never Smoker     Smokeless tobacco: Never Used   Substance Use Topics     Alcohol use: Yes     Comment: Alcoholic Drinks/day: 2 drinks/week       Review of Systems   See HPI    Vitals:    04/05/22 1650 04/05/22 1712   BP: (!) 158/84 (!) 162/78   BP Location: Right arm    Patient Position: Sitting    Cuff Size: Adult Regular    Pulse: 60    Resp: 16    SpO2: 99%        Physical Exam  Vitals and nursing note reviewed.   Constitutional:       General: She is not in acute distress.     Appearance: She is not toxic-appearing or diaphoretic.   HENT:      Head: Normocephalic and atraumatic.      Right Ear: External ear normal.      Left Ear: External ear normal.   Eyes:      Conjunctiva/sclera: Conjunctivae normal.   Cardiovascular:      Rate and Rhythm: Normal rate and regular rhythm.      Heart sounds: No murmur heard.  Pulmonary:      Effort: Pulmonary effort is normal. No respiratory distress.      Breath sounds: No stridor. Wheezing present. No rhonchi or rales.   Skin:     Findings: No rash.   Neurological:      Mental Status: She is alert.      GCS: GCS eye subscore is 4. GCS verbal subscore is 5. GCS motor subscore is 6.       Cranial Nerves: Cranial nerves are intact. No cranial nerve deficit or facial asymmetry.      Sensory: Sensory deficit present.      Motor: Motor function is intact. No weakness, tremor, atrophy, abnormal muscle tone, seizure activity or pronator drift.      Coordination: Coordination is intact. Romberg sign negative. Finger-Nose-Finger Test normal. Rapid alternating movements normal.      Gait: Gait is intact. Gait normal.   Psychiatric:         Mood and Affect: Mood normal.         Behavior: Behavior normal.         Thought Content: Thought content normal.         Judgment: Judgment normal.         Results:  Results for orders placed or performed in visit on 04/05/22   XR Chest 2 Views     Status: None    Narrative    EXAM DATE:         04/05/2022    EXAM: X-RAY CHEST, 2 VIEWS, FRONTAL AND LATERAL  LOCATION: [facility]  DATE/TIME: 4/5/2022 5:15 PM    INDICATION: Wheezes mid and lower lung fields.  COMPARISON: None.    IMPRESSION: Negative chest.             ESR: Erythrocyte sedimentation rate     Status: Normal   Result Value Ref Range    Erythrocyte Sedimentation Rate 5 0 - 20 mm/hr   CBC with platelets     Status: Normal   Result Value Ref Range    WBC Count 8.0 4.0 - 11.0 10e3/uL    RBC Count 5.18 3.80 - 5.20 10e6/uL    Hemoglobin 15.4 11.7 - 15.7 g/dL    Hematocrit 45.9 35.0 - 47.0 %    MCV 89 78 - 100 fL    MCH 29.7 26.5 - 33.0 pg    MCHC 33.6 31.5 - 36.5 g/dL    RDW 11.5 10.0 - 15.0 %    Platelet Count 260 150 - 450 10e3/uL         At the end of the encounter, I discussed results, diagnosis, medications. Discussed red flags for immediate return to clinic/ER, as well as indications for follow up if no improvement. Patient understood and agreed to plan. Patient was stable for discharge.

## 2022-04-05 NOTE — TELEPHONE ENCOUNTER
Triage call:     Patient calling with numbness and tingling on the right hand, fingers and right side of her face. These symptoms started March 26th and lasted a few minutes. They have been recurring intermittently ever since.   At the time of the call she feels very slight tingling and numbness on the right side of her jaw, the right side of her lips and right side of her nose.     At the time of the call she feels very mild tingling and numbness right side jaw, upper and lower lip and right side of nose.     Per protocol, patient was advised to be seen in office now. If no appointments, discussed walk in care. She verbalizes understanding and agreement with this plan. Transferred to scheduling.     Sherice Hammer RN   04/05/22 4:11 PM  [facility] Nurse Advisor    Reason for Disposition    Neurologic deficit of gradual onset, ANY of the following: * Weakness of the face, arm, or leg on one side of the body * Numbness of the face, arm, or leg on one side of the body * Loss of speech or garbled speech    Additional Information    Negative: Difficult to awaken or acting confused (e.g., disoriented, slurred speech)    Negative: New neurologic deficit that is present NOW, sudden onset of ANY of the following: * Weakness of the face, arm, or leg on one side of the body* Numbness of the face, arm, or leg on one side of the body* Loss of speech or garbled speech    Negative: Sounds like a life-threatening emergency to the triager    Negative: Confusion, disorientation, or hallucinations is the main symptom    Negative: Dizziness is the main symptom    Negative: Followed a head injury within last 3 days    Negative: Headache (with neurologic deficit)    Negative: Unable to urinate (or only a few drops) and bladder feels very full    Negative: Loss of control of bowel or bladder (i.e., incontinence) of new onset    Negative: Back pain with numbness (loss of sensation) in groin or rectal area    Negative: Patient sounds  very sick or weak to the triager    Negative: Neurologic deficit that was brief (now gone), ANY of the following: * Weakness of the face, arm, or leg on one side of the body * Numbness of the face, arm, or leg on one side of the body * Loss of speech or garbled speech    Protocols used: NEUROLOGIC DEFICIT-A-[place]    COVID 19 Nurse Triage Plan/Patient Instructions    Please be aware that novel coronavirus (COVID-19) may be circulating in the community. If you develop symptoms such as fever, cough, or SOB or if you have concerns about the presence of another infection including coronavirus (COVID-19), please contact your health care provider or visit [email or web address].     Disposition/Instructions    In-Person Visit with provider recommended. Reference Visit Selection Guide.    Thank you for taking steps to prevent the spread of this virus.  o Limit your contact with others.  o Wear a simple mask to cover your cough.  o Wash your hands well and often.    Resources    [facility]: About COVID-19: [email or web address]    CDC: What to Do If You're Sick: www.cdc.gov/coronavirus/2019-ncov/about/steps-when-sick.html    CDC: Ending Home Isolation: www.cdc.gov/coronavirus/2019-ncov/hcp/disposition-in-home-patients.html     CDC: Caring for Someone: www.cdc.gov/coronavirus/2019-ncov/if-you-are-sick/care-for-someone.html     [facility]: Interim Guidance for Hospital Discharge to Home: www.[email or web address]/coronavirus/hcp/hospdischarge.pdf    [facility] clinical trials (COVID-19 research studies): [email or web address]-trials     Below are the COVID-19 hotlines at the [place] [facility] ([facility]). Interpreters are available.   o For health questions: Call 204-197-9736 or 1-568.532.3925 (7 a.m. to 7 p.m.)  o For questions about schools and childcare: Call 334-482-6246 or 1-226.277.3166 (7 a.m. to 7 p.m.)

## 2022-04-05 NOTE — PATIENT INSTRUCTIONS
1.  I will call you with your lab results when they are available.  2.  If you are labs and x-ray are normal please follow-up with primary care if your tingling sensation is not improving over the course the next 1 week.  3.  Seek emergency medical attention if you develop head pain, vision changes, weakness, dizziness, or high fever.

## 2022-04-06 NOTE — TELEPHONE ENCOUNTER
Pls call pt and schedule for follow up about her neurologic symptoms. Reserved slot ok. In next 1-2 weeks

## 2022-04-15 ENCOUNTER — OFFICE VISIT (OUTPATIENT)
Dept: FAMILY MEDICINE | Facility: CLINIC | Age: 72
End: 2022-04-15
Payer: COMMERCIAL

## 2022-04-15 VITALS
HEART RATE: 64 BPM | DIASTOLIC BLOOD PRESSURE: 65 MMHG | RESPIRATION RATE: 16 BRPM | BODY MASS INDEX: 24.98 KG/M2 | SYSTOLIC BLOOD PRESSURE: 152 MMHG | WEIGHT: 138.8 LBS

## 2022-04-15 DIAGNOSIS — M79.672 INTRACTABLE LEFT HEEL PAIN: ICD-10-CM

## 2022-04-15 DIAGNOSIS — J32.9 CHRONIC SINUSITIS, UNSPECIFIED LOCATION: ICD-10-CM

## 2022-04-15 DIAGNOSIS — Z91.09 ENVIRONMENTAL ALLERGIES: ICD-10-CM

## 2022-04-15 DIAGNOSIS — I10 BENIGN ESSENTIAL HYPERTENSION: ICD-10-CM

## 2022-04-15 DIAGNOSIS — R20.0 NUMBNESS AND TINGLING OF RIGHT SIDE OF FACE: Primary | ICD-10-CM

## 2022-04-15 DIAGNOSIS — R20.2 NUMBNESS AND TINGLING OF RIGHT SIDE OF FACE: Primary | ICD-10-CM

## 2022-04-15 DIAGNOSIS — R20.0 NUMBNESS OF RIGHT HAND: ICD-10-CM

## 2022-04-15 DIAGNOSIS — E78.5 HYPERLIPIDEMIA, UNSPECIFIED HYPERLIPIDEMIA TYPE: ICD-10-CM

## 2022-04-15 PROCEDURE — 99214 OFFICE O/P EST MOD 30 MIN: CPT | Performed by: FAMILY MEDICINE

## 2022-04-15 RX ORDER — AZELASTINE 1 MG/ML
SPRAY, METERED NASAL
Qty: 30 ML | Refills: 12 | Status: SHIPPED | OUTPATIENT
Start: 2022-04-15 | End: 2022-05-13

## 2022-04-15 RX ORDER — LISINOPRIL 5 MG/1
5 TABLET ORAL DAILY
Qty: 90 TABLET | Refills: 1 | Status: SHIPPED | OUTPATIENT
Start: 2022-04-15 | End: 2022-05-04

## 2022-04-15 NOTE — PROGRESS NOTES
Assessment & Plan     Numbness and tingling of right side of face  Numbness of right hand  Symptoms lasted for almost a week before going away.  No neurological finding today.  Given history of hypertension hyperlipidemia, will pursue MRI of her brain.  Order for her today.  Patient agreed with plan.  - MR Brain w/o & w Contrast    Hyperlipidemia, unspecified hyperlipidemia type  On pravastatin however if MRI abnormal may need to switch to high intensity statin.   - MR Brain w/o & w Contrast    Chronic sinusitis, unspecified location  Environmental allergies  Symptoms are starting again due to spring allergies.  Refilled Astelin for her.  - azelastine (ASTELIN) 0.1 % nasal spray  Dispense: 30 mL; Refill: 12    Benign essential hypertension  Elevated on 2 checks. Will start treatment.  Return to clinic in 2 weeks for nurse only visit for blood pressure check and further adjustment to her lisinopril will be made then.  - lisinopril (ZESTRIL) 5 MG tablet  Dispense: 90 tablet; Refill: 1    Intractable left heel pain  Chronic.  Ongoing.  We will send her back to the podiatrist.  She used to see Dr. Lupillo Antunez in [place].  - Orthopedic  Referral        Return in about 6 months (around 10/15/2022) for Routine preventive, with me, in person.    Leelee Butcher MD  [facility]    Monisha Cummings is a 71 year old who presents for the following health issues     History of Present Illness       Reason for visit:  Temporary numbness in my upper and lower lip and chin  Symptom onset:  1-2 weeks ago  Symptoms include:  Numbness described above  Symptom intensity:  Mild  Symptom progression:  Improving  Had these symptoms before:  No  What makes it worse:  Nothing I can think of  What makes it better:  There does not seem to be anything that makes it feel any different    She eats 4 or more servings of fruits and vegetables daily.She consumes 1 sweetened beverage(s) daily.She  exercises with enough effort to increase her heart rate 20 to 29 minutes per day.  She exercises with enough effort to increase her heart rate 5 days per week.   She is taking medications regularly.     Chief Complaint   Patient presents with     RECHECK     Follow up from [facility] neurological symptoms Started March 26 and 4/5 was seen at [facility]      Right hand fingers/thumb went numb when she was brushing her teeth. Then right eye, upper lip and right lower shin were numb as well. This numbness lasted hours. She had to take care of her  and household activities so she didn't seek care until she went to [facility]. Symptoms were about a week in total.   Reviewed [facility] visit and lab result - blood work was good, CXR was good.     Review of Systems   Constitutional, HEENT, cardiovascular, pulmonary, gi and gu systems are negative, except as otherwise noted.      Objective    BP (!) 151/67 (BP Location: Left arm, Patient Position: Sitting, Cuff Size: Adult Regular)   Pulse 66   Resp 16   Wt 63 kg (138 lb 12.8 oz)   BMI 24.98 kg/m    Body mass index is 24.98 kg/m .     Physical Exam   GENERAL: healthy, alert and no distress  NECK: no adenopathy, no asymmetry, masses, or scars and thyroid normal to palpation, no carotid bruit  RESP: lungs clear to auscultation - no rales, rhonchi or wheezes  CV: regular rate and rhythm, normal S1 S2, no S3 or S4, no murmur, click or rub, no peripheral edema and peripheral pulses strong  ABDOMEN: soft, nontender, no hepatosplenomegaly, no masses and bowel sounds normal  MS: no gross musculoskeletal defects noted, no edema  NEURO: Normal strength and tone, mentation intact and speech normal, CN2-12 normal. No slurred speech  PSYCH: mentation appears normal, affect normal/bright

## 2022-04-22 ENCOUNTER — HOSPITAL ENCOUNTER (OUTPATIENT)
Dept: MRI IMAGING | Facility: CLINIC | Age: 72
Discharge: HOME | End: 2022-04-22
Attending: FAMILY MEDICINE | Admitting: FAMILY MEDICINE
Payer: COMMERCIAL

## 2022-04-22 DIAGNOSIS — R20.2 NUMBNESS AND TINGLING OF RIGHT SIDE OF FACE: ICD-10-CM

## 2022-04-22 DIAGNOSIS — R20.0 NUMBNESS AND TINGLING OF RIGHT SIDE OF FACE: ICD-10-CM

## 2022-04-22 DIAGNOSIS — E78.5 HYPERLIPIDEMIA, UNSPECIFIED HYPERLIPIDEMIA TYPE: ICD-10-CM

## 2022-04-22 DIAGNOSIS — R20.0 NUMBNESS OF RIGHT HAND: ICD-10-CM

## 2022-04-22 PROCEDURE — 255N000002 HC RX 255 OP 636: Performed by: FAMILY MEDICINE

## 2022-04-22 PROCEDURE — 70553 MRI BRAIN STEM W/O & W/DYE: CPT

## 2022-04-22 PROCEDURE — A9585 GADOBUTROL INJECTION: HCPCS | Performed by: FAMILY MEDICINE

## 2022-04-22 RX ORDER — GADOBUTROL 604.72 MG/ML
6 INJECTION INTRAVENOUS ONCE
Status: COMPLETED | OUTPATIENT
Start: 2022-04-22 | End: 2022-04-22

## 2022-04-22 RX ADMIN — GADOBUTROL 6 ML: 604.72 INJECTION INTRAVENOUS at 21:11

## 2022-04-25 ENCOUNTER — MYC MEDICAL ADVICE (OUTPATIENT)
Dept: FAMILY MEDICINE | Facility: CLINIC | Age: 72
End: 2022-04-25
Payer: COMMERCIAL

## 2022-04-25 DIAGNOSIS — E78.5 HYPERLIPIDEMIA, UNSPECIFIED HYPERLIPIDEMIA TYPE: Primary | ICD-10-CM

## 2022-04-25 RX ORDER — ROSUVASTATIN CALCIUM 20 MG/1
20 TABLET, COATED ORAL DAILY
Qty: 90 TABLET | Refills: 1 | Status: SHIPPED | OUTPATIENT
Start: 2022-04-25 | End: 2022-07-18

## 2022-04-29 ENCOUNTER — ALLIED HEALTH/NURSE VISIT (OUTPATIENT)
Dept: FAMILY MEDICINE | Facility: CLINIC | Age: 72
End: 2022-04-29
Payer: COMMERCIAL

## 2022-04-29 VITALS — HEART RATE: 69 BPM | SYSTOLIC BLOOD PRESSURE: 138 MMHG | DIASTOLIC BLOOD PRESSURE: 64 MMHG

## 2022-04-29 DIAGNOSIS — I10 ESSENTIAL HYPERTENSION: Primary | ICD-10-CM

## 2022-04-29 PROCEDURE — 99207 PR NO CHARGE NURSE ONLY: CPT

## 2022-04-29 NOTE — PROGRESS NOTES
Follow Up Blood Pressure Check    Areli Higginbotham is a 71 year old female recommended to follow up for blood pressure check by Leelee Butcher. Anihypertensive medications and adherence were verified: Yes.     Reason for visit: Recheck BP     Benign essential hypertension  Elevated on 2 checks. Will start treatment.  Return to clinic in 2 weeks for nurse only visit for blood pressure check and further adjustment to her lisinopril will be made then.  - lisinopril (ZESTRIL) 5 MG tablet  Dispense: 90 tablet; Refill: 1    Medication change at last visit:     Today's Vitals:   Vitals:    04/29/22 0957 04/29/22 1007   BP: (!) 143/65 138/64   BP Location: Left arm Left arm   Patient Position: Sitting Sitting   Cuff Size: Adult Regular Adult Regular   Pulse: 71 69           Lowest blood pressure today is less than 140/90 and they deny signs or symptoms of new onset: severe headache, fatigue, confusion, vision changes, chest pain, pounding in the chest, neck, ears, irregular heartbeat, difficulty breathing and blood in the urine.  Please inform patient of his/her blood pressure today.  If they are asymptomatic, the patient is to continue current medications.  This message will be routed to their provider, and they will be notified if a change in medication is recommended.      Leticia Martinez    Current Outpatient Medications   Medication Sig Dispense Refill     acetaminophen (TYLENOL) 325 MG tablet Take 1,000 mg by mouth as needed        ascorbic acid (VITAMIN C) 1000 MG tablet [ASCORBIC ACID (VITAMIN C) 1000 MG TABLET] Take 500 mg by mouth daily.        azelastine (ASTELIN) 0.1 % nasal spray [AZELASTINE (ASTELIN) 137 MCG (0.1 %) NASAL SPRAY] 2 sprays each nostril once daily 30 mL 12     CALCIUM ORAL [CALCIUM ORAL] Take 1 tablet by mouth daily.       cetirizine (ZYRTEC) 10 MG tablet [CETIRIZINE (ZYRTEC) 10 MG TABLET] Take 10 mg by mouth as needed for allergies.       cholecalciferol, vitamin D3, (VITAMIN D3) 2,000 unit  cap [CHOLECALCIFEROL, VITAMIN D3, (VITAMIN D3) 2,000 UNIT CAP] Take 3,000 Units by mouth daily.        denosumab (PROLIA) 60 MG/ML SOSY injection Inject 60 mg Subcutaneous every 6 months        lisinopril (ZESTRIL) 5 MG tablet Take 1 tablet (5 mg) by mouth daily 90 tablet 1     pramipexole (MIRAPEX) 0.25 MG tablet TAKE 2 TABLETS BY MOUTH 2-3 HOURS BEFORE BEDTIME. MAX 0.75 MG DAILY 180 tablet 1     rosuvastatin (CRESTOR) 20 MG tablet Take 1 tablet (20 mg) by mouth daily 90 tablet 1     VIT C/VIT E AC/LUT/COPPER/ZINC (PRESERVISION LUTEIN ORAL) [VIT C/VIT E AC/LUT/COPPER/ZINC (PRESERVISION LUTEIN ORAL)] Take 1 capsule by mouth daily.

## 2022-05-11 DIAGNOSIS — J32.9 CHRONIC SINUSITIS, UNSPECIFIED LOCATION: ICD-10-CM

## 2022-05-11 DIAGNOSIS — Z91.09 ENVIRONMENTAL ALLERGIES: ICD-10-CM

## 2022-05-13 RX ORDER — AZELASTINE 1 MG/ML
SPRAY, METERED NASAL
Qty: 30 ML | Refills: 12 | Status: SHIPPED | OUTPATIENT
Start: 2022-05-13 | End: 2023-10-27

## 2022-05-24 ENCOUNTER — TRANSFERRED RECORDS (OUTPATIENT)
Dept: HEALTH INFORMATION MANAGEMENT | Facility: CLINIC | Age: 72
End: 2022-05-24
Payer: COMMERCIAL

## 2022-05-31 ENCOUNTER — TRANSFERRED RECORDS (OUTPATIENT)
Dept: HEALTH INFORMATION MANAGEMENT | Facility: CLINIC | Age: 72
End: 2022-05-31
Payer: COMMERCIAL

## 2022-06-28 ENCOUNTER — TRANSFERRED RECORDS (OUTPATIENT)
Dept: HEALTH INFORMATION MANAGEMENT | Facility: CLINIC | Age: 72
End: 2022-06-28

## 2022-07-06 ENCOUNTER — ALLIED HEALTH/NURSE VISIT (OUTPATIENT)
Dept: FAMILY MEDICINE | Facility: CLINIC | Age: 72
End: 2022-07-06
Payer: COMMERCIAL

## 2022-07-06 DIAGNOSIS — M81.0 SENILE OSTEOPOROSIS: Primary | ICD-10-CM

## 2022-07-06 PROCEDURE — 99207 PR NO CHARGE NURSE ONLY: CPT

## 2022-07-06 PROCEDURE — 96372 THER/PROPH/DIAG INJ SC/IM: CPT | Performed by: INTERNAL MEDICINE

## 2022-07-06 NOTE — PROGRESS NOTES
"Prolia Injection Phone Screen      Screening questions have been asked 2-3 days prior to administration visit for Prolia. If any questions are answered with \"Yes,\" this phone encounter were will routed to ordering provider for further evaluation.     1.  When was the last injection?  1/6/22    2.  Has insurance for this injection been verified?  Yes    3.  Did you experience any new onset achiness or rashes that lasted for over a month with your previous Prolia injection?   No    4.  Do you have a fever over 101?F or a new deep cough that is unusual for you today?   No    5.  Have you started any new medications in the last 6 months that you were told could affect your immune system? These may have been prescribed by oncologist, transplant, rheumatology, or dermatology.   No    6.  In the last 6 months have you have gastric bypass or parathyroid surgery?   No    7.  Do you plan dental work requiring drilling into the bone such as implants/extractions or oral surgery in the next 2-3 months?   No    8. Do you have new insurance since the last injection? No    9. Have you received the Covid-19 vaccine? Yes  If No - Proceed with Prolia injection  If Yes - Date of vaccination 3/1/21, 3/29/21, 11/2/21. Will need to wait until 2 weeks after 2nd dose of Covid-19 vaccine before administering Prolia       Patient informed if symptoms discussed above present prior to their administration appointment, they are to notify clinic immediately.     Katherin Baires    The following steps were completed to comply with the REMS program for Prolia:  1. Ordering provider has previously reviewed information in the Medication Guide and Patient Counseling Chart, including the serious risks of Prolia  and the symptoms of each risk and have been advised to seek prompt medical attention if they have signs or symptoms of any of the serious risks.  2. Provided each patient a copy of the Medication Guide and Patient Brochure.  See MAR for " administration details.   Indication: Prolia  (denosumab) is a prescription medicine used to treat osteoporosis in patients who:   Are at high risk for fracture, meaning patients who have had a fracture related to osteoporosis, or who have multiple risk factors for fracture; Cannot use another osteoporosis medicine or other osteoporosis medicines did not work well.   The timeline for early/late injections would be 4 weeks early and any time after the 6 month heladio. If a patient receives their injection late, then the subsequent injection would be 6 months from the date that they actually received the injection    Have the screening questions been asked prior to this administration? Yes

## 2022-07-07 DIAGNOSIS — G25.81 RESTLESS LEGS SYNDROME (RLS): ICD-10-CM

## 2022-07-08 RX ORDER — PRAMIPEXOLE DIHYDROCHLORIDE 0.25 MG/1
TABLET ORAL
Qty: 180 TABLET | Refills: 1 | Status: SHIPPED | OUTPATIENT
Start: 2022-07-08 | End: 2022-11-22

## 2022-07-08 NOTE — TELEPHONE ENCOUNTER
"Last Written Prescription Date:  12/30/21  Last Fill Quantity: 180,  # refills: 1   Last office visit provider:  4/15/22     Requested Prescriptions   Pending Prescriptions Disp Refills     pramipexole (MIRAPEX) 0.25 MG tablet [Pharmacy Med Name: PRAMIPEXOLE 0.25 MG TABLET] 180 tablet 1     Sig: TAKE 2 TABLETS BY MOUTH 2-3 HOURS BEFORE BEDTIME. MAX 0.75 MG DAILY       Antiparkinson's Agents Protocol Passed - 7/7/2022  4:28 AM        Passed - Blood pressure under 140/90 in past 12 months     BP Readings from Last 3 Encounters:   04/29/22 138/64   04/15/22 (!) 152/65   04/05/22 (!) 162/78                 Passed - CBC on record in past 12 months     Recent Labs   Lab Test 04/05/22  1720   WBC 8.0   RBC 5.18   HGB 15.4   HCT 45.9                    Passed - ALT on record in past 12 months         Recent Labs   Lab Test 07/16/21  0936   ALT 39             Passed - Serum Creatinine on file in past 12 months     Recent Labs   Lab Test 07/16/21  0936   CR 0.71       Ok to refill medication if creatinine is low          Passed - Medication is active on med list        Passed - Patient is age 18 or older        Passed - No active pregnancy on record        Passed - No positive pregnancy test in the past 12 months        Passed - Recent (6 mo) or future (30 days) visit within the authorizing provider's specialty     Patient had office visit in the last 6 months or has a visit in the next 30 days with authorizing provider or within the authorizing provider's specialty.  See \"Patient Info\" tab in inbasket, or \"Choose Columns\" in Meds & Orders section of the refill encounter.                 Pauline Stevens RN 07/08/22 1:27 AM  "

## 2022-07-12 ASSESSMENT — ENCOUNTER SYMPTOMS
HEMATOCHEZIA: 0
JOINT SWELLING: 0
DIZZINESS: 0
WEAKNESS: 0
SHORTNESS OF BREATH: 0
HEMATURIA: 0
PALPITATIONS: 0
BREAST MASS: 0
COUGH: 0
FREQUENCY: 0
DYSURIA: 0
HEADACHES: 0
PARESTHESIAS: 0
CHILLS: 0
SORE THROAT: 0
CONSTIPATION: 0
DIARRHEA: 0
ABDOMINAL PAIN: 0
ARTHRALGIAS: 0
MYALGIAS: 0
NERVOUS/ANXIOUS: 0
EYE PAIN: 0
HEARTBURN: 0
NAUSEA: 0
FEVER: 0

## 2022-07-12 ASSESSMENT — ACTIVITIES OF DAILY LIVING (ADL): CURRENT_FUNCTION: NO ASSISTANCE NEEDED

## 2022-07-18 ENCOUNTER — OFFICE VISIT (OUTPATIENT)
Dept: FAMILY MEDICINE | Facility: CLINIC | Age: 72
End: 2022-07-18
Payer: COMMERCIAL

## 2022-07-18 VITALS
BODY MASS INDEX: 25.21 KG/M2 | HEART RATE: 65 BPM | RESPIRATION RATE: 16 BRPM | HEIGHT: 62 IN | WEIGHT: 137 LBS | SYSTOLIC BLOOD PRESSURE: 134 MMHG | DIASTOLIC BLOOD PRESSURE: 54 MMHG

## 2022-07-18 DIAGNOSIS — S62.92XD CLOSED FRACTURE OF LEFT HAND WITH ROUTINE HEALING, SUBSEQUENT ENCOUNTER: ICD-10-CM

## 2022-07-18 DIAGNOSIS — Z00.00 ENCOUNTER FOR MEDICARE ANNUAL WELLNESS EXAM: Primary | ICD-10-CM

## 2022-07-18 DIAGNOSIS — M81.0 OSTEOPOROSIS, UNSPECIFIED OSTEOPOROSIS TYPE, UNSPECIFIED PATHOLOGICAL FRACTURE PRESENCE: ICD-10-CM

## 2022-07-18 DIAGNOSIS — R14.0 ABDOMINAL BLOATING: ICD-10-CM

## 2022-07-18 DIAGNOSIS — E78.5 HYPERLIPIDEMIA, UNSPECIFIED HYPERLIPIDEMIA TYPE: ICD-10-CM

## 2022-07-18 DIAGNOSIS — G25.81 RESTLESS LEGS SYNDROME (RLS): ICD-10-CM

## 2022-07-18 DIAGNOSIS — R20.2 NUMBNESS AND TINGLING OF RIGHT SIDE OF FACE: ICD-10-CM

## 2022-07-18 DIAGNOSIS — I10 ESSENTIAL HYPERTENSION: ICD-10-CM

## 2022-07-18 DIAGNOSIS — J32.9 CHRONIC SINUSITIS, UNSPECIFIED LOCATION: ICD-10-CM

## 2022-07-18 DIAGNOSIS — R20.0 NUMBNESS AND TINGLING OF RIGHT SIDE OF FACE: ICD-10-CM

## 2022-07-18 DIAGNOSIS — F51.01 PRIMARY INSOMNIA: ICD-10-CM

## 2022-07-18 DIAGNOSIS — R20.0 NUMBNESS OF RIGHT HAND: ICD-10-CM

## 2022-07-18 DIAGNOSIS — M77.32 HEEL SPUR, LEFT: ICD-10-CM

## 2022-07-18 DIAGNOSIS — Z91.09 ENVIRONMENTAL ALLERGIES: ICD-10-CM

## 2022-07-18 LAB
ALBUMIN SERPL BCG-MCNC: 4.8 G/DL (ref 3.5–5.2)
ALP SERPL-CCNC: 73 U/L (ref 35–104)
ALT SERPL W P-5'-P-CCNC: 45 U/L (ref 10–35)
ANION GAP SERPL CALCULATED.3IONS-SCNC: 11 MMOL/L (ref 7–15)
AST SERPL W P-5'-P-CCNC: 30 U/L (ref 10–35)
BILIRUB SERPL-MCNC: 0.7 MG/DL
BUN SERPL-MCNC: 12.4 MG/DL (ref 8–23)
CALCIUM SERPL-MCNC: 9.4 MG/DL (ref 8.8–10.2)
CHLORIDE SERPL-SCNC: 102 MMOL/L (ref 98–107)
CHOLEST SERPL-MCNC: 149 MG/DL
CREAT SERPL-MCNC: 0.62 MG/DL (ref 0.51–0.95)
DEPRECATED CALCIDIOL+CALCIFEROL SERPL-MC: 51 UG/L (ref 20–75)
DEPRECATED HCO3 PLAS-SCNC: 28 MMOL/L (ref 22–29)
ERYTHROCYTE [DISTWIDTH] IN BLOOD BY AUTOMATED COUNT: 11.7 % (ref 10–15)
GFR SERPL CREATININE-BSD FRML MDRD: >90 ML/MIN/1.73M2
GLUCOSE SERPL-MCNC: 97 MG/DL (ref 70–99)
HCT VFR BLD AUTO: 43.1 % (ref 35–47)
HDLC SERPL-MCNC: 78 MG/DL
HGB BLD-MCNC: 14.6 G/DL (ref 11.7–15.7)
LDLC SERPL CALC-MCNC: 53 MG/DL
MCH RBC QN AUTO: 30 PG (ref 26.5–33)
MCHC RBC AUTO-ENTMCNC: 33.9 G/DL (ref 31.5–36.5)
MCV RBC AUTO: 89 FL (ref 78–100)
NONHDLC SERPL-MCNC: 71 MG/DL
PLATELET # BLD AUTO: 239 10E3/UL (ref 150–450)
POTASSIUM SERPL-SCNC: 4.5 MMOL/L (ref 3.4–5.3)
PROT SERPL-MCNC: 6.8 G/DL (ref 6.4–8.3)
PTH-INTACT SERPL-MCNC: 50 PG/ML (ref 15–65)
RBC # BLD AUTO: 4.86 10E6/UL (ref 3.8–5.2)
SODIUM SERPL-SCNC: 141 MMOL/L (ref 136–145)
TRIGL SERPL-MCNC: 90 MG/DL
WBC # BLD AUTO: 5.6 10E3/UL (ref 4–11)

## 2022-07-18 PROCEDURE — 90715 TDAP VACCINE 7 YRS/> IM: CPT | Performed by: FAMILY MEDICINE

## 2022-07-18 PROCEDURE — 80053 COMPREHEN METABOLIC PANEL: CPT | Performed by: FAMILY MEDICINE

## 2022-07-18 PROCEDURE — 90471 IMMUNIZATION ADMIN: CPT | Performed by: FAMILY MEDICINE

## 2022-07-18 PROCEDURE — 36415 COLL VENOUS BLD VENIPUNCTURE: CPT | Performed by: FAMILY MEDICINE

## 2022-07-18 PROCEDURE — 83970 ASSAY OF PARATHORMONE: CPT | Performed by: FAMILY MEDICINE

## 2022-07-18 PROCEDURE — 99214 OFFICE O/P EST MOD 30 MIN: CPT | Mod: 25 | Performed by: FAMILY MEDICINE

## 2022-07-18 PROCEDURE — 85027 COMPLETE CBC AUTOMATED: CPT | Performed by: FAMILY MEDICINE

## 2022-07-18 PROCEDURE — G0438 PPPS, INITIAL VISIT: HCPCS | Performed by: FAMILY MEDICINE

## 2022-07-18 PROCEDURE — 80061 LIPID PANEL: CPT | Performed by: FAMILY MEDICINE

## 2022-07-18 PROCEDURE — 82306 VITAMIN D 25 HYDROXY: CPT | Performed by: FAMILY MEDICINE

## 2022-07-18 RX ORDER — ROSUVASTATIN CALCIUM 20 MG/1
20 TABLET, COATED ORAL DAILY
Qty: 90 TABLET | Refills: 3 | Status: SHIPPED | OUTPATIENT
Start: 2022-07-18 | End: 2023-06-30

## 2022-07-18 ASSESSMENT — ENCOUNTER SYMPTOMS
NERVOUS/ANXIOUS: 0
PARESTHESIAS: 0
COUGH: 0
HEADACHES: 0
HEARTBURN: 0
MYALGIAS: 0
ARTHRALGIAS: 0
BREAST MASS: 0
JOINT SWELLING: 0
WEAKNESS: 0
PALPITATIONS: 0
HEMATOCHEZIA: 0
FEVER: 0
CONSTIPATION: 0
CHILLS: 0
HEMATURIA: 0
SHORTNESS OF BREATH: 0
FREQUENCY: 0
DIZZINESS: 0
NAUSEA: 0
EYE PAIN: 0
DYSURIA: 0
SORE THROAT: 0
ABDOMINAL PAIN: 0
DIARRHEA: 0

## 2022-07-18 ASSESSMENT — ACTIVITIES OF DAILY LIVING (ADL): CURRENT_FUNCTION: NO ASSISTANCE NEEDED

## 2022-07-18 NOTE — PROGRESS NOTES
"SUBJECTIVE:   Areli Higginbotham is a 71 year old female who presents for Preventive Visit.    Patient has been advised of split billing requirements and indicates understanding: Yes     Are you in the first 12 months of your Medicare coverage?  No    Healthy Habits:     In general, how would you rate your overall health?  Excellent    Frequency of exercise:  4-5 days/week    Duration of exercise:  30-45 minutes    Do you usually eat at least 4 servings of fruit and vegetables a day, include whole grains    & fiber and avoid regularly eating high fat or \"junk\" foods?  No    Taking medications regularly:  Yes    Medication side effects:  None    Ability to successfully perform activities of daily living:  No assistance needed    Home Safety:  Lack of grab bars in the bathroom    Hearing Impairment:  Difficulty following a conversation in a noisy restaurant or crowded room, difficulty following dialogue in the theater, need to ask people to speak up or repeat themselves and difficulty understanding soft or whispered speech    In the past 6 months, have you been bothered by leaking of urine? Yes    In general, how would you rate your overall mental or emotional health?  Good      PHQ-2 Total Score: 0    Additional concerns today:  No    Do you feel safe in your environment? Yes    Have you ever done Advance Care Planning? (For example, a Health Directive, POLST, or a discussion with a medical provider or your loved ones about your wishes): Yes, patient states has an Advance Care Planning document and will bring a copy to the clinic.    Fall risk  Fallen 2 or more times in the past year?: No  Any fall with injury in the past year?: Yes    Cognitive Screening   1) Repeat 3 items (Leader, Season, Table)    2) Clock draw: NORMAL  3) 3 item recall: Recalls 2 objects   Results: NORMAL clock, 1-2 items recalled: COGNITIVE IMPAIRMENT LESS LIKELY      Reviewed and updated as needed this visit by clinical staff   Tobacco  " Allergies  Meds                Reviewed and updated as needed this visit by Provider                   Social History     Tobacco Use     Smoking status: Never Smoker     Smokeless tobacco: Never Used   Substance Use Topics     Alcohol use: Yes     Comment: Alcoholic Drinks/day: 2 drinks/week     If you drink alcohol do you typically have >3 drinks per day or >7 drinks per week? No    Alcohol Use 7/12/2022   Prescreen: >3 drinks/day or >7 drinks/week? No   Prescreen: >3 drinks/day or >7 drinks/week? -     Hyperlipidemia Follow-Up      Are you regularly taking any medication or supplement to lower your cholesterol?   Yes- Rosuvastatin 20 mg daily    Are you having muscle aches or other side effects that you think could be caused by your cholesterol lowering medication?  No    Current providers sharing in care for this patient include:   Patient Care Team:  Leelee Butcher MD as PCP - General (Family Medicine)  Pauline Gan as Assigned OBGYN Provider  Leelee Butcher MD as Assigned PCP    The following health maintenance items are reviewed in Epic and correct as of today:  Health Maintenance Due   Topic Date Due     COVID-19 Vaccine (4 - Booster for Moderna series) 03/02/2022     DTAP/TDAP/TD IMMUNIZATION (2 - Td or Tdap) 05/04/2022     ANNUAL REVIEW OF HM ORDERS  07/16/2022     MEDICARE ANNUAL WELLNESS VISIT  07/16/2022     Lab work is in process  Mammogram Screening: Mammogram Screening: Recommended mammography every 1-2 years with patient discussion and risk factor consideration    Review of Systems   Constitutional: Negative for chills and fever.   HENT: Positive for congestion and hearing loss. Negative for ear pain and sore throat.    Eyes: Negative for pain and visual disturbance.   Respiratory: Negative for cough and shortness of breath.    Cardiovascular: Negative for chest pain, palpitations and peripheral edema.   Gastrointestinal: Negative for abdominal pain, constipation, diarrhea, heartburn,  "hematochezia and nausea.   Breasts:  Negative for tenderness, breast mass and discharge.   Genitourinary: Negative for dysuria, frequency, genital sores, hematuria, pelvic pain, urgency, vaginal bleeding and vaginal discharge.   Musculoskeletal: Negative for arthralgias, joint swelling and myalgias.   Skin: Negative for rash.   Neurological: Negative for dizziness, weakness, headaches and paresthesias.   Psychiatric/Behavioral: Negative for mood changes. The patient is not nervous/anxious.      Constitutional, HEENT, cardiovascular, pulmonary, GI, , musculoskeletal, neuro, skin, endocrine and psych systems are negative, except as otherwise noted.    OBJECTIVE:   /54 (BP Location: Left arm, Patient Position: Sitting, Cuff Size: Adult Regular)   Pulse 65   Resp 16   Ht 1.581 m (5' 2.25\")   Wt 62.1 kg (137 lb)   BMI 24.86 kg/m   Estimated body mass index is 24.86 kg/m  as calculated from the following:    Height as of this encounter: 1.581 m (5' 2.25\").    Weight as of this encounter: 62.1 kg (137 lb).  Physical Exam  GENERAL APPEARANCE: healthy, alert and no distress  EYES: Eyes grossly normal to inspection, PERRL and conjunctivae and sclerae normal  HENT: grossly normal  NECK: no adenopathy, no asymmetry, masses, or scars and thyroid normal to palpation  RESP: lungs clear to auscultation - no rales, rhonchi or wheezes  BREAST: normal without masses, tenderness or nipple discharge and no palpable axillary masses or adenopathy  CV: regular rate and rhythm, normal S1 S2, no S3 or S4, no murmur, click or rub, no peripheral edema and peripheral pulses strong  ABDOMEN: soft, nontender, no hepatosplenomegaly, no masses and bowel sounds normal  MS: no musculoskeletal defects are noted and gait is age appropriate without ataxia  SKIN: no suspicious lesions or rashes  NEURO: Normal strength and tone, sensory exam grossly normal, mentation intact and speech normal  PSYCH: mentation appears normal and affect " normal/bright    Diagnostic Test Results:  Labs reviewed in Epic    ASSESSMENT / PLAN:   Areli was seen today for wellness visit.    Diagnoses and all orders for this visit:    Encounter for Medicare annual wellness exam  Routine history and physical, updated in EMR.  Hyperlipidemia, unspecified hyperlipidemia type  -     rosuvastatin (CRESTOR) 20 MG tablet; Take 1 tablet (20 mg) by mouth daily  -     Lipid panel; Future  -     Comprehensive metabolic panel (BMP + Alb, Alk Phos, ALT, AST, Total. Bili, TP); Future  -     CBC with platelets; Future    Osteoporosis, unspecified osteoporosis type, unspecified pathological fracture presence  SeeingDr. Miller.   -     Comprehensive metabolic panel (BMP + Alb, Alk Phos, ALT, AST, Total. Bili, TP); Future  -     Parathyroid Hormone Intact; Future  -     Vitamin D Deficiency; Future    Restless legs syndrome (RLS)  On mirapex.     Chronic sinusitis, unspecified location  Environmental allergies  Stable. On astelin and zyrtec    Essential hypertension  -     Comprehensive metabolic panel (BMP + Alb, Alk Phos, ALT, AST, Total. Bili, TP); Future    Numbness of right hand  Numbness and tingling of right side of face  Now on statin.   MRI brain normal.    Heel spur, left  Seeing podiatry. Wearing layered heel pad    Closed fracture of left hand with routine healing, subsequent encounter  5/18/22 and was seen in [facility]. Sat [facility] afterwards, out of brace.    Abdominal bloating  Discussed elimination of dairy. If not improving consider low FODMAP diet    Primary insomnia  Has no problem falling asleep.  Goes to bed at night, waking up at 1 or 1:30 AM consistently every day.  Does not take naps.  Denies any other symptoms.  Discussed circadian rhythm.  Discussing potentially going to bed later so she can wake up later and allowing herself to take naps during the day.  She does not want to take any medication.    Other orders  -     TDAP VACCINE (Adacel, Boostrix)  -     REVIEW OF  "HEALTH MAINTENANCE PROTOCOL ORDERS        Patient has been advised of split billing requirements and indicates understanding: Yes    COUNSELING:  Reviewed preventive health counseling, as reflected in patient instructions       Regular exercise       Healthy diet/nutrition       Osteoporosis prevention/bone health    Estimated body mass index is 24.86 kg/m  as calculated from the following:    Height as of this encounter: 1.581 m (5' 2.25\").    Weight as of this encounter: 62.1 kg (137 lb).        She reports that she has never smoked. She has never used smokeless tobacco.      Appropriate preventive services were discussed with this patient, including applicable screening as appropriate for cardiovascular disease, diabetes, osteopenia/osteoporosis, and glaucoma.  As appropriate for age/gender, discussed screening for colorectal cancer, prostate cancer, breast cancer, and cervical cancer. Checklist reviewing preventive services available has been given to the patient.    Reviewed patients plan of care and provided an AVS. The Basic Care Plan (routine screening as documented in Health Maintenance) for Areli meets the Care Plan requirement. This Care Plan has been established and reviewed with the Patient.    Counseling Resources:  ATP IV Guidelines  Pooled Cohorts Equation Calculator  Breast Cancer Risk Calculator  Breast Cancer: Medication to Reduce Risk  FRAX Risk Assessment  ICSI Preventive Guidelines  Dietary Guidelines for Americans, 2010  [employer] MyPlate  ASA Prophylaxis  Lung CA Screening    Leelee Butcher MD  [facility]    Identified Health Risks:  "

## 2022-07-18 NOTE — PATIENT INSTRUCTIONS
Patient Education   Personalized Prevention Plan  You are due for the preventive services outlined below.  Your care team is available to assist you in scheduling these services.  If you have already completed any of these items, please share that information with your care team to update in your medical record.  Health Maintenance Due   Topic Date Due     COVID-19 Vaccine (4 - Booster for Moderna series) 03/02/2022     Diptheria Tetanus Pertussis (DTAP/TDAP/TD) Vaccine (2 - Td or Tdap) 05/04/2022     ANNUAL REVIEW OF HM ORDERS  07/16/2022       Understanding USDA MyPlate  The USDA has guidelines to help you make healthy food choices. These are called MyPlate. MyPlate shows the food groups that make up healthy meals using the image of a place setting. Before you eat, think about the healthiest choices for what to put on your plate or in your cup or bowl. To learn more about building a healthy plate, visit www.choosemyplate.gov.    The food groups    Fruits. Any fruit or 100% fruit juice counts as part of the Fruit Group. Fruits may be fresh, canned, frozen, or dried, and may be whole, cut-up, or pureed. Make 1/2 of your plate fruits and vegetables.    Vegetables. Any vegetable or 100% vegetable juice counts as a member of the Vegetable Group. Vegetables may be fresh, frozen, canned, or dried. They can be served raw or cooked and may be whole, cut-up, or mashed. Make 1/2 of your plate fruits and vegetables.    Grains. All foods made from grains are part of the Grains Group. These include wheat, rice, oats, cornmeal, and barley. Grains are often used to make foods such as bread, pasta, oatmeal, cereal, tortillas, and grits. Grains should be no more than 1/4 of your plate. At least half of your grains should be whole grains.    Protein. This group includes meat, poultry, seafood, beans and peas, eggs, processed soy products (such as tofu), nuts (including nut butters), and seeds. Make protein choices no more than 1/4  of your plate. Meat and poultry choices should be lean or low fat.    Dairy. The Dairy Group includes all fluid milk products and foods made from milk that contain calcium, such as yogurt and cheese. (Foods that have little calcium, such as cream, butter, and cream cheese, are not part of this group.) Most dairy choices should be low-fat or fat-free.    Oils. Oils aren't a food group, but they do contain essential nutrients. However it's important to watch your intake of oils. These are fats that are liquid at room temperature. They include canola, corn, olive, soybean, vegetable, and sunflower oil. Foods that are mainly oil include mayonnaise, certain salad dressings, and soft margarines. You likely already get your daily oil allowance from the foods you eat.  Things to limit  Eating healthy also means limiting these things in your diet:       Salt (sodium). Many processed foods have a lot of sodium. To keep sodium intake down, eat fresh vegetables, meats, poultry, and seafood when possible. Purchase low-sodium, reduced-sodium, or no-salt-added food products at the store. And don't add salt to your meals at home. Instead, season them with herbs and spices such as dill, oregano, cumin, and paprika. Or try adding flavor with lemon or lime zest and juice.    Saturated fat. Saturated fats are most often found in animal products such as beef, pork, and chicken. They are often solid at room temperature, such as butter. To reduce your saturated fat intake, choose leaner cuts of meat and poultry. And try healthier cooking methods such as grilling, broiling, roasting, or baking. For a simple lower-fat swap, use plain nonfat yogurt instead of mayonnaise when making potato salad or macaroni salad.    Added sugars. These are sugars added to foods. They are in foods such as ice cream, candy, soda, fruit drinks, sports drinks, energy drinks, cookies, pastries, jams, and syrups. Cut down on added sugars by sharing sweet treats  with a family member or friend. You can also choose fruit for dessert, and drink water or other unsweetened beverages.     [employer] last reviewed this educational content on 6/1/2020 2000-2021 [employer]. All rights reserved. This information is not intended as a substitute for professional medical care. Always follow your healthcare professional's instructions.          Signs of Hearing Loss      Hearing much better with one ear can be a sign of hearing loss.   Hearing loss is a problem shared by many people. In fact, it is one of the most common health problems, particularly as people age. Most people age 65 and older have some hearing loss. By age 80, almost everyone does. Hearing loss often occurs slowly over the years. So you may not realize your hearing has gotten worse.  Have your hearing checked  Call your healthcare provider if you:    Have to strain to hear normal conversation    Have to watch other people s faces very carefully to follow what they re saying    Need to ask people to repeat what they ve said    Often misunderstand what people are saying    Turn the volume of the television or radio up so high that others complain    Feel that people are mumbling when they re talking to you    Find that the effort to hear leaves you feeling tired and irritated    Notice, when using the phone, that you hear better with one ear than the other  [email or web address] last reviewed this educational content on 1/1/2020 2000-2021 [employer]. All rights reserved. This information is not intended as a substitute for professional medical care. Always follow your healthcare professional's instructions.          Urinary Incontinence, Female (Adult)   Urinary incontinence means loss of bladder control. This problem affects many women, especially as they get older. If you have incontinence, you may be embarrassed to ask for help. But know that this problem can be treated.   Types of  Incontinence  There are different types of incontinence. Two of the main types are described here. You can have more than one type.     Stress incontinence. With this type, urine leaks when pressure (stress) is put on the bladder. This may happen when you cough, sneeze, or laugh. Stress incontinence most often occurs because the pelvic floor muscles that support the bladder and urethra are weak. This can happen after pregnancy and vaginal childbirth or a hysterectomy. It can also be due to excess body weight or hormone changes.    Urge incontinence (also called overactive bladder). With this type, a sudden urge to urinate is felt often. This may happen even though there may not be much urine in the bladder. The need to urinate often during the night is common. Urge incontinence most often occurs because of bladder spasms. This may be due to bladder irritation or infection. Damage to bladder nerves or pelvic muscles, constipation, and certain medicines can also lead to urge incontinence.  Treatment depends on the cause. Further evaluation is needed to find the type you have. This will likely include an exam and certain tests. Based on the results, you and your healthcare provider can then plan treatment. Until a diagnosis is made, the home care tips below can help ease symptoms.   Home care    Do pelvic floor muscle exercises, if they are prescribed. The pelvic floor muscles help support the bladder and urethra. Many women find that their symptoms improve when doing special exercises that strengthen these muscles. To do the exercises, contract the muscles you would use to stop your stream of urine. But do this when you re not urinating. Hold for 10 seconds, then relax. Repeat 10 to 20 times in a row, at least 3 times a day. Your healthcare provider may give you other instructions for how to do the exercises and how often.    Keep a bladder diary. This helps track how often and how much you urinate over a set period  of time. Bring this diary with you to your next visit with the provider. The information can help your provider learn more about your bladder problem.    Lose weight, if advised to by your provider. Extra weight puts pressure on the bladder. Your provider can help you create a weight-loss plan that s right for you. This may include exercising more and making certain diet changes.    Don't have foods and drinks that may irritate the bladder. These can include alcohol and caffeinated drinks.    Quit smoking. Smoking and other tobacco use can lead to a long-term (chronic) cough that strains the pelvic floor muscles. Smoking may also damage the bladder and urethra. Talk with your provider about treatments or methods you can use to quit smoking.    If drinking large amounts of fluid makes you have symptoms, you may be advised to limit your fluid intake. You may also be advised to drink most of your fluids during the day and to limit fluids at night.    If you re worried about urine leakage or accidents, you may wear absorbent pads to catch urine. Change the pads often. This helps reduce discomfort. It may also reduce the risk of skin or bladder infections.    Follow-up care  Follow up with your healthcare provider, or as directed. It may take some to find the right treatment for your problem. But healthy lifestyle changes can be made right away. These include such things as exercising on a regular basis, eating a healthy diet, losing weight (if needed), and quitting smoking. Your treatment plan may include special therapies or medicines. Certain procedures or surgery may also be options. Talk about any questions you have with your provider.   When to seek medical advice  Call the healthcare provider right away if any of these occur:    Fever of 100.4 F (38 C) or higher, or as directed by your provider    Bladder pain or fullness    Belly swelling    Nausea or vomiting    Back pain    Weakness, dizziness, or  jie Crenshaw last reviewed this educational content on 1/1/2020 2000-2021 [employer]. All rights reserved. This information is not intended as a substitute for professional medical care. Always follow your healthcare professional's instructions.          Treating Incontinence in Women: Nonsurgical Methods     The best treatment for you will depend on the type of incontinence you have. Your symptoms, age, and any underlying problems that are found also affect your treatment. Some types of incontinence may over time require surgery. But nonsurgical treatments may be effective in many cases. Nonsurgical treatments include lifestyle changes, muscle-strengthening exercises, and medicines.   Nonsurgical treatments  Treatment for stress urinary incontinence includes:     Bladder training    Lifestyle changes such as weight loss and increased activity if incontinence is due to being overweight    Medicines, if bladder training has not helped    Pelvic floor muscle exercises  Lifestyle changes    Losing weight. Excess weight puts extra pressure on the pelvic floor muscles. Exercising and eating right can help you lose weight. This helps other treatments work better.    Making certain diet changes. Some foods may make you need to urinate more, so it may be good not to have them. These include caffeinated drinks and alcohol. Ask your healthcare provider if these or other diet changes might be helpful.    Quitting smoking. Smoking can lead to a long-term (chronic) cough that strains pelvic floor muscles. Smoking may also damage the bladder and urethra.    Pelvic floor muscle exercises  There are exercises you can do to help strengthen your pelvic floor muscles. The pelvic floor muscles act as a sling to help hold the bladder and urethra in place. These muscles also help keep the urethra closed. Weak pelvic floor muscles may allow urine to leak. To strengthen the pelvic floor muscles, do the exercises  daily. In a few months, the muscles will be stronger and tighter. This can help prevent urine leakage.   [employer] last reviewed this educational content on 9/1/2019 2000-2021 [employer]. All rights reserved. This information is not intended as a substitute for professional medical care. Always follow your healthcare professional's instructions.          Treating Incontinence in Women: Special Therapies      During biofeedback, sensors send signals from your pelvic floor muscles to a computer screen.   Your healthcare provider will discuss your options for treating your urinary incontinence. These depend on the cause of your problem and any other health issues you have. Often behavioral changes are tried first, followed by various medicines. If these methods are unsuccessful, one or more of the therapies described below may be part of your treatment plan.   Biofeedback  This method is taught by a nurse or physical therapist. During the therapy, a small sensor is placed in your vagina or rectum. Another sensor is placed on your stomach. Other types of sensors are also available. These sensors read signals from the pelvic floor muscles. When you contract or relax your muscles, these signals are shown as images on a computer screen. Using the images, you can learn to relax or contract certain muscles. This can help you strengthen and better control these muscles. And it can help you learn pelvic floor muscle exercises.   Electrical stimulation  This is a painless therapy that uses a tiny amount of electric current. It helps strengthen very weak or damaged pelvic floor muscles. The electric current is sent through the muscles of the pelvic floor and bladder. This causes the muscles to contract. In time, this helps make the muscles stronger.   Stimulator implants  This method is used to treat urge incontinence. A small device is implanted under the skin near the upper buttocks. This device gives off mild  electrical signals. These block extra signals that are being sent to the bladder muscle. This helps the bladder work more normally.   [employer] last reviewed this educational content on 6/1/2019 2000-2021 [employer]. All rights reserved. This information is not intended as a substitute for professional medical care. Always follow your healthcare professional's instructions.          Treating Incontinence in Women with Medicine    Urinary incontinence is the leaking of urine from the bladder. In some cases, medicine can reduce or stop the leaking. It's mainly given for urge incontinence. Your healthcare provider will talk with you about your options. Make sure to ask what side effects to expect.   Below are some types of medicines that may help with urge incontinence.   Types of medicine    Anticholinergics or beta-3 adrenergics.  These may increase how much urine the bladder can hold. They may also help relax bladder muscles.    Estrogen. This may help improve muscle tone in the urethra and bladder.    Antibiotics. These are used to treat urinary tract infections.    Botulinum toxin. Injection of botulinum toxin into the bladder muscle is an option when other medicines are not effective.    Tips for taking medicine    Take your medicine on time and as your healthcare provider tell you to.    Tell your healthcare provider if you have any side effects, your dosage may be adjusted if needed.    Be patient. It may take time to find the right dose for you.    Keep a list of the medicines you take. Show it to your healthcare provider and pharmacist before you buy over-the-counter medicines.    [employer] last reviewed this educational content on 9/1/2019 2000-2021 [employer]. All rights reserved. This information is not intended as a substitute for professional medical care. Always follow your healthcare professional's instructions.          Kegel Exercises  Kegel exercises are done to  help strengthen the muscles in your pelvic floor. You don t need special clothing or equipment. They re easy to learn and simple to do. And if you do them right, no one can tell you re doing them, so they can be done almost anywhere. Your healthcare provider, nurse, or physical therapist can answer any questions you have and help you get started.   A weak pelvic floor  The pelvic floor muscles may weaken due to aging, pregnancy and vaginal childbirth, injury, surgery, chronic cough, or lack of exercise. If the pelvic floor is weak, your bladder and other pelvic organs may sag out of place. The urethra may also open too easily and allow urine to leak out. Kegel exercises can help you strengthen your pelvic floor muscles. Then they can better support the pelvic organs and control urine flow.     How Kegel exercises are done  Try each of the Kegel exercises described below. When you re doing them, try not to move your leg, buttock, or stomach muscles:     Contract as if you were stopping your urine stream. But do it when you re not urinating.    Tighten your rectum as if trying not to pass gas. Contract your anus, but don t move your buttocks.    You may place a finger or 2 in the vagina and squeeze your finger with your vagina to learn which muscles to tighten.  Try to hold each Kegel for a slow count to 5. You probably won t be able to hold them for that long at first. But keep practicing. It will get easier as your pelvic floor gets stronger. Eventually, special weights that you place in your vagina may be recommended to help make your Kegels even more effective. Talk to your healthcare provider if you have trouble doing Kegel exercises.   Helpful tips  Here are some tips to follow:    Do your Kegels as often as you can. The more you do them, the faster you ll feel the results.    Pick an activity you do often as a reminder. For instance, do your Kegels every time you sit down.    Tighten your pelvic floor before  you sneeze, get up from a chair, cough, laugh, or lift. This can help prevent urine, gas, or stool leakage.  [employer] last reviewed this educational content on 8/1/2020 2000-2021 [employer]. All rights reserved. This information is not intended as a substitute for professional medical care. Always follow your healthcare professional's instructions.

## 2022-09-02 ENCOUNTER — TRANSFERRED RECORDS (OUTPATIENT)
Dept: HEALTH INFORMATION MANAGEMENT | Facility: CLINIC | Age: 72
End: 2022-09-02

## 2022-09-04 DIAGNOSIS — G25.81 RESTLESS LEGS SYNDROME (RLS): ICD-10-CM

## 2022-09-04 RX ORDER — PRAMIPEXOLE DIHYDROCHLORIDE 0.25 MG/1
TABLET ORAL
Qty: 180 TABLET | Refills: 1 | OUTPATIENT
Start: 2022-09-04

## 2022-09-25 ENCOUNTER — HEALTH MAINTENANCE LETTER (OUTPATIENT)
Age: 72
End: 2022-09-25

## 2022-09-27 ENCOUNTER — TRANSFERRED RECORDS (OUTPATIENT)
Dept: HEALTH INFORMATION MANAGEMENT | Facility: CLINIC | Age: 72
End: 2022-09-27

## 2022-11-17 DIAGNOSIS — M81.0 OSTEOPOROSIS, UNSPECIFIED OSTEOPOROSIS TYPE, UNSPECIFIED PATHOLOGICAL FRACTURE PRESENCE: Primary | ICD-10-CM

## 2022-11-17 DIAGNOSIS — Z92.29 PERSONAL HISTORY OF OTHER DRUG THERAPY: ICD-10-CM

## 2022-11-20 DIAGNOSIS — G25.81 RESTLESS LEGS SYNDROME (RLS): ICD-10-CM

## 2022-11-21 NOTE — TELEPHONE ENCOUNTER
"Routing refill request to provider for review/approval because:  Early refill requested.    Last Written Prescription Date:  7/8/22  Last Fill Quantity: 180,  # refills: 1   Last office visit provider:  7/18/22     Requested Prescriptions   Pending Prescriptions Disp Refills     pramipexole (MIRAPEX) 0.25 MG tablet [Pharmacy Med Name: PRAMIPEXOLE 0.25 MG TABLET] 180 tablet 1     Sig: TAKE 2 TABLETS BY MOUTH 2-3 HOURS BEFORE BEDTIME. MAY TAKE ADDITIONAL TABLET IF NEEDED. MAX .75MG       Antiparkinson's Agents Protocol Passed - 11/21/2022  7:40 AM        Passed - Blood pressure under 140/90 in past 12 months     BP Readings from Last 3 Encounters:   07/18/22 134/54   04/29/22 138/64   04/15/22 (!) 152/65                 Passed - CBC on record in past 12 months     Recent Labs   Lab Test 07/18/22  1004   WBC 5.6   RBC 4.86   HGB 14.6   HCT 43.1                    Passed - ALT on record in past 12 months         Recent Labs   Lab Test 07/18/22  1004   ALT 45*             Passed - Serum Creatinine on file in past 12 months     Recent Labs   Lab Test 07/18/22  1004   CR 0.62       Ok to refill medication if creatinine is low          Passed - Medication is active on med list        Passed - Patient is age 18 or older        Passed - No active pregnancy on record        Passed - No positive pregnancy test in the past 12 months        Passed - Recent (6 mo) or future (30 days) visit within the authorizing provider's specialty     Patient had office visit in the last 6 months or has a visit in the next 30 days with authorizing provider or within the authorizing provider's specialty.  See \"Patient Info\" tab in inbasket, or \"Choose Columns\" in Meds & Orders section of the refill encounter.                 Jeremy Harrison RN 11/21/22 7:41 AM  "

## 2022-11-22 RX ORDER — PRAMIPEXOLE DIHYDROCHLORIDE 0.25 MG/1
TABLET ORAL
Qty: 180 TABLET | Refills: 1 | Status: SHIPPED | OUTPATIENT
Start: 2022-11-22 | End: 2023-01-09

## 2023-01-08 ENCOUNTER — MYC MEDICAL ADVICE (OUTPATIENT)
Dept: FAMILY MEDICINE | Facility: CLINIC | Age: 73
End: 2023-01-08

## 2023-01-08 DIAGNOSIS — G25.81 RESTLESS LEGS SYNDROME (RLS): ICD-10-CM

## 2023-01-09 RX ORDER — PRAMIPEXOLE DIHYDROCHLORIDE 0.25 MG/1
TABLET ORAL
Qty: 180 TABLET | Refills: 1 | Status: SHIPPED | OUTPATIENT
Start: 2023-01-09 | End: 2023-05-15

## 2023-01-17 ENCOUNTER — ALLIED HEALTH/NURSE VISIT (OUTPATIENT)
Dept: FAMILY MEDICINE | Facility: CLINIC | Age: 73
End: 2023-01-17
Payer: COMMERCIAL

## 2023-01-17 DIAGNOSIS — M81.0 OSTEOPOROSIS, UNSPECIFIED OSTEOPOROSIS TYPE, UNSPECIFIED PATHOLOGICAL FRACTURE PRESENCE: Primary | ICD-10-CM

## 2023-01-17 PROCEDURE — 99207 PR NO CHARGE NURSE ONLY: CPT

## 2023-01-17 PROCEDURE — 96372 THER/PROPH/DIAG INJ SC/IM: CPT | Performed by: INTERNAL MEDICINE

## 2023-01-17 NOTE — PROGRESS NOTES
"Prolia Injection Phone Screen      Screening questions have been asked 2-3 days prior to administration visit for Prolia. If any questions are answered with \"Yes,\" this phone encounter were will routed to ordering provider for further evaluation.     1.  When was the last injection?  7-6-2022    2.  Has insurance for this injection been verified?  Yes    3.  Did you experience any new onset achiness or rashes that lasted for over a month with your previous Prolia injection?   No    4.  Do you have a fever over 101?F or a new deep cough that is unusual for you today? No    5.  Have you started any new medications in the last 6 months that you were told could affect your immune system? These may have been prescribed by oncologist, transplant, rheumatology, or dermatology.   No    6.  In the last 6 months have you have gastric bypass or parathyroid surgery?   No    7.  Do you plan dental work requiring drilling into the bone such as implants/extractions or oral surgery in the next 2-3 months?   No    8. Do you have new insurance since the last injection?    9. Have you received the Covid-19 vaccine? No  If No - Proceed with Prolia injection  If Yes - Date of vaccination . Will need to wait until 2 weeks after 2nd dose of Covid-19 vaccine before administering Prolia       Patient informed if symptoms discussed above present prior to their administration appointment, they are to notify clinic immediately.     Lizet Urena            "

## 2023-05-12 ENCOUNTER — VIRTUAL VISIT (OUTPATIENT)
Dept: FAMILY MEDICINE | Facility: CLINIC | Age: 73
End: 2023-05-12
Payer: COMMERCIAL

## 2023-05-12 DIAGNOSIS — R68.83 CHILLS: ICD-10-CM

## 2023-05-12 DIAGNOSIS — U07.1 INFECTION DUE TO 2019 NOVEL CORONAVIRUS: Primary | ICD-10-CM

## 2023-05-12 DIAGNOSIS — R53.83 FATIGUE, UNSPECIFIED TYPE: ICD-10-CM

## 2023-05-12 DIAGNOSIS — R05.9 COUGH, UNSPECIFIED TYPE: ICD-10-CM

## 2023-05-12 DIAGNOSIS — R52 BODY ACHES: ICD-10-CM

## 2023-05-12 DIAGNOSIS — R09.81 NASAL CONGESTION: ICD-10-CM

## 2023-05-12 PROCEDURE — 99214 OFFICE O/P EST MOD 30 MIN: CPT | Mod: VID | Performed by: FAMILY MEDICINE

## 2023-05-12 NOTE — PROGRESS NOTES
Zoila is a 72 year old who is being evaluated via a billable video visit.      How would you like to obtain your AVS? MyChart  If the video visit is dropped, the invitation should be resent by: Text to cell phone: [phone]  Will anyone else be joining your video visit? No          Assessment & Plan     Infection due to 2019 novel coronavirus  Chills  Cough, unspecified type  Body aches  Nasal congestion  Fatigue, unspecified type  Advised pt that home test is adequate, no need to re-test.   Advised symptomatic treatment including otc medications for her nasal congestion/dry cough  Symptoms started 2 weeks ago she so paxlovid not indicated. She's doing well otherwise no other covid 19 specific treatment is needed. Pt agreed with plan.       Leelee Butcher MD  [facility]   Zoila is a 72 year old, presenting for the following health issues:  Covid Concern (Positive at home test yesterday, nasal congestion/drainage, cough, fatigue, myalgia, Discuss Paxlovid treatment )      History of Present Illness       Reason for visit:  I had a positive Covid home test and would like to get that verified.  Symptom onset:  1-2 weeks ago  Symptoms include:  Cough, nasal drainage and congestion,fatigue, body aches, occasional chills  Symptom intensity:  Mild  Symptom progression:  Improving  Had these symptoms before:  No  What makes it worse:  No  What makes it better:  Getting my work done.    She eats 2-3 servings of fruits and vegetables daily.She consumes 1 sweetened beverage(s) daily.She exercises with enough effort to increase her heart rate 30 to 60 minutes per day.  She exercises with enough effort to increase her heart rate 3 or less days per week.   She is taking medications regularly.       COVID-19 Symptom Review  How many days ago did these symptoms start? 1 week. Patient thinks it actually started 2 weeks ago. Her  has bronchitis as well.     Are any of the  following symptoms significant for you?    New or worsening difficulty breathing? No    Worsening cough? Yes, it's a dry cough.     Fever or chills? Yes, I felt feverish or had chills.    Headache: YES    Sore throat: No    Chest pain: No    Diarrhea: No    Body aches? YES    Still having nasal congestion today. No longer having chills/body aches. Still has some fatigue. Has been using Nettipot. Outside the window     What treatments has patient tried? Acetaminophen   Does patient live in a nursing home, group home, or shelter? No  Does patient have a way to get food/medications during quarantined? Yes, I have a friend or family member who can help me.       Review of Systems   Constitutional, HEENT, cardiovascular, pulmonary, gi and gu systems are negative, except as otherwise noted.      Objective         Vitals:  No vitals were obtained today due to virtual visit.    Physical Exam   GENERAL: Healthy, alert and no distress  EYES: Eyes grossly normal to inspection.  No discharge or erythema, or obvious scleral/conjunctival abnormalities.  RESP: No audible wheeze, cough, or visible cyanosis.  No visible retractions or increased work of breathing.    SKIN: Visible skin clear. No significant rash, abnormal pigmentation or lesions.  NEURO: Cranial nerves grossly intact.  Mentation and speech appropriate for age.  PSYCH: Mentation appears normal, affect normal/bright, judgement and insight intact, normal speech and appearance well-groomed.    No results found for this or any previous visit (from the past 24 hour(s)).          Video-Visit Details    Type of service:  Video Visit   Video Start Time: 9:30 AM  Video End Time:9:40 AM    Originating Location (pt. Location): Home  Distant Location (provider location):  On-site  Platform used for Video Visit: [facility]

## 2023-05-12 NOTE — PATIENT INSTRUCTIONS
COVID-19 Outpatient Treatments  Your care team can help you find the best treatments for COVID-19. Talk to a health care provider or refer to the FDA medicine fact sheets below.    Important: You can't have Paxlovid or molnupiravir if you're starting the medicine more than 5 days after your symptoms have started.  Paxlovid: https://www.fda.gov/media/037024/download  Molnupiravir (Lagevrio): https://www.fda.gov/media/385317/download  Paxlovid (nimatrelvir and ritonavir)  How it works  Two medicines (nirmatrelvir and ritonavir) are taken together. They stop the virus from growing. Less amount of virus is easier for your body to fight.  Benefits  Lowers risk of a hospital stay or death from COVID-19.  How to take    Medicine comes in a daily container with both medicine tablets. Take by mouth twice daily (once in the morning, once at night) for 5 days.    The number of tablets to take varies by patient.    Don't chew or break capsules. Swallow whole.  When to take  Take as soon as possible after positive COVID-19 test result, and within 5 days of your first symptoms.  Who can take it  Patients must be 12 years or older, weigh at least 88 pounds, and have tested positive for COVID-19. Paxlovid is the preferred treatment for pregnant patients.  Possible side effects  Can cause altered sense of taste, diarrhea (loose, watery stools), high blood pressure, muscle aches.  Medicine conflicts    Some medicines may conflict with Paxlovid and may cause serious side effects.    Tell your care team about all the medicines you take, including prescription and over-the-counter medicines, vitamins, and herbal supplements.    Your care team will review your medicines to make sure that you can safely take Paxlovid.  Cautions    Paxlovid is not advised for patients with severe kidney or liver disease. If you have kidney or liver problems, the dose may need to be changed.    If you're pregnant or breastfeeding, talk to your care team  about your options.    If you take hormonal birth control (such as the Pill), then you or your partner should also use a non-hormonal form of birth control (such as a condom). Keep doing this for 1 menstrual cycle after your last dose of Paxlovid.  Molnupiravir (Lagevrio)  How it works  Stops the virus from growing. Less amount of virus is easier for your body to fight.  Benefits  Lowers risk of a hospital stay or death from COVID-19.  How to take  Take 4 capsules by mouth every 12 hours (4 in the morning and 4 at night) for 5 days. Don't chew or break capsules. Swallow whole.  When to take  Take as soon as possible after positive COVID-19 test result, and within 5 days of your first symptoms.  Who can take it  Patients must be 18 years or older and have tested positive for COVID-19.  Possible side effects  Diarrhea (loose, watery stools), nausea (feeling sick to your stomach), dizziness, headaches.  Medicine conflicts  Right now, there are no known conflicts with other drugs. But tell your care team about all medicines you take.  Cautions    This medicine is not advised for patients who are pregnant.    If you are someone who could become pregnant, use trusted birth control until 4 days after your last dose of molnupiravir.    If your partner could become pregnant, you should use trusted birth control until 3 months after your last dose of molnupiravir.  For informational purposes only. Not to replace the advice of your health care provider. Copyright   2022 [facility]. All rights reserved. Clinically reviewed by Isha Young, PharmD, BCACP. Divided [id number] - REV 12/22.    Coping with Life After COVID-19  Being in the hospital because of COVID-19 is scary. Going home can be scary, too. You may face changes to your life, the way you work or what you can eat. It s hard to adjust to change, and it s normal to feel afraid, frustrated or even angry. These feelings usually go away over time. If your  feelings don t start to get better, it s called  adjustment disorder.      What signs should I look out for?  Adjustment disorder can happen to anyone in a time of stress. It makes it hard to cope with daily life. You may feel lonely or fight with loved ones, even if you re glad to be home. Watch for these signs:    Fear or worry    Hard time focusing    Sadness or anger    Trouble talking to family or friends    Feeling like you don t fit in or isolating yourself    Problems with sleep     Drinking alcohol or taking drugs to cope    What can I do?  You can help yourself get better. Feeling you have control helps you move forward. You may wonder if you ll be able to do things you did before. Be patient. Do your best to make the most of every day. Try to build relationships, be as active as you can, eat right and keep a sense of humor. Avoid smoking and drinking too much alcohol. Call your family doctor or clinic if you re not sure what to do. They can guide you to care or other services.    When should I get help?  Think about getting counseling if your sadness or frustration gets worse. Together with a trained counselor, you can talk about your problems adjusting to life after your hospital stay. You can come up with new ways to handle changes that give you more control. Your family doctor or care team can help you find a counselor.     Get help if you re thinking about hurting yourself. If you need help right away, call 911 or go to the nearest emergency room. You can also try the Crisis Text Line.    Crisis Text Line: 927-420 (http://www.crisistextline.org)  The Crisis Text Line serves anyone, in any crisis. It gives free, 24/7 support. Here's how it works:  1. Text HOME to 444132 from anywhere in the [place], anytime, about any type of crisis.  2. A live, trained Crisis Counselor will text you back quickly.  3. The volunteer Crisis Counselor can help you move from a  hot moment  to a  cool moment.  They can also  help you work out a safety plan.

## 2023-05-15 DIAGNOSIS — G25.81 RESTLESS LEGS SYNDROME (RLS): ICD-10-CM

## 2023-05-15 RX ORDER — PRAMIPEXOLE DIHYDROCHLORIDE 0.25 MG/1
TABLET ORAL
Qty: 270 TABLET | Refills: 0 | Status: SHIPPED | OUTPATIENT
Start: 2023-05-15 | End: 2023-09-29

## 2023-05-15 NOTE — TELEPHONE ENCOUNTER
"Last Written Prescription Date:  1/9/23  Last Fill Quantity: 180,  # refills: 1   Last office visit provider:  5/12/23    Requested Prescriptions   Pending Prescriptions Disp Refills     pramipexole (MIRAPEX) 0.25 MG tablet [Pharmacy Med Name: PRAMIPEXOLE 0.25 MG TABLET] 270 tablet 1     Sig: TAKE 2 TABLETS BY MOUTH 2-3 HOURS BEFORE BEDTIME. MAY TAKE ADDITIONAL TABLET IF NEEDED. MAX: 3TABS       Antiparkinson's Agents Protocol Passed - 5/15/2023  1:53 AM        Passed - Blood pressure under 140/90 in past 12 months     BP Readings from Last 3 Encounters:   07/18/22 134/54   04/29/22 138/64   04/15/22 (!) 152/65                 Passed - CBC on record in past 12 months     Recent Labs   Lab Test 07/18/22  1004   WBC 5.6   RBC 4.86   HGB 14.6   HCT 43.1                    Passed - ALT on record in past 12 months         Recent Labs   Lab Test 07/18/22  1004   ALT 45*             Passed - Serum Creatinine on file in past 12 months     Recent Labs   Lab Test 07/18/22  1004   CR 0.62       Ok to refill medication if creatinine is low          Passed - Medication is active on med list        Passed - Patient is age 18 or older        Passed - No active pregnancy on record        Passed - No positive pregnancy test in the past 12 months        Passed - Recent (6 mo) or future (30 days) visit within the authorizing provider's specialty     Patient had office visit in the last 6 months or has a visit in the next 30 days with authorizing provider or within the authorizing provider's specialty.  See \"Patient Info\" tab in inbasket, or \"Choose Columns\" in Meds & Orders section of the refill encounter.                 Shae Frey RN 05/15/23 1:53 AM  "

## 2023-06-30 DIAGNOSIS — E78.5 HYPERLIPIDEMIA, UNSPECIFIED HYPERLIPIDEMIA TYPE: ICD-10-CM

## 2023-06-30 RX ORDER — ROSUVASTATIN CALCIUM 20 MG/1
TABLET, COATED ORAL
Qty: 90 TABLET | Refills: 3 | Status: SHIPPED | OUTPATIENT
Start: 2023-06-30 | End: 2024-07-23

## 2023-06-30 NOTE — TELEPHONE ENCOUNTER
"Last Written Prescription Date:  7/18/2022  Last Fill Quantity: 90,  # refills: 3   Last office visit provider:  5/12/2023   Routing refill request to provider for review/approval because:  Early refill    Requested Prescriptions   Pending Prescriptions Disp Refills     rosuvastatin (CRESTOR) 20 MG tablet [Pharmacy Med Name: ROSUVASTATIN CALCIUM 20 MG TAB] 90 tablet 3     Sig: TAKE 1 TABLET BY MOUTH EVERY DAY       Statins Protocol Passed - 6/30/2023  1:54 PM        Passed - LDL on file in past 12 months     Recent Labs   Lab Test 07/18/22  1004   LDL 53             Passed - No abnormal creatine kinase in past 12 months     No lab results found.             Passed - Recent (12 mo) or future (30 days) visit within the authorizing provider's specialty     Patient has had an office visit with the authorizing provider or a provider within the authorizing providers department within the previous 12 mos or has a future within next 30 days. See \"Patient Info\" tab in inbasket, or \"Choose Columns\" in Meds & Orders section of the refill encounter.              Passed - Medication is active on med list        Passed - Patient is age 18 or older        Passed - No active pregnancy on record        Passed - No positive pregnancy test in past 12 months             Maryuri Hernández RN 06/30/23 1:54 PM  "

## 2023-08-30 ENCOUNTER — OFFICE VISIT (OUTPATIENT)
Dept: INTERNAL MEDICINE | Facility: CLINIC | Age: 73
End: 2023-08-30
Payer: COMMERCIAL

## 2023-08-30 VITALS
SYSTOLIC BLOOD PRESSURE: 131 MMHG | DIASTOLIC BLOOD PRESSURE: 58 MMHG | HEART RATE: 67 BPM | RESPIRATION RATE: 16 BRPM | TEMPERATURE: 98 F | OXYGEN SATURATION: 96 % | BODY MASS INDEX: 24.79 KG/M2 | WEIGHT: 134.7 LBS | HEIGHT: 62 IN

## 2023-08-30 DIAGNOSIS — M81.0 AGE RELATED OSTEOPOROSIS, UNSPECIFIED PATHOLOGICAL FRACTURE PRESENCE: Primary | ICD-10-CM

## 2023-08-30 DIAGNOSIS — M87.180 OSTEONECROSIS OF JAW DUE TO DRUG (H): ICD-10-CM

## 2023-08-30 PROBLEM — Z87.39 HISTORY OF OSTEONECROSIS: Status: RESOLVED | Noted: 2022-01-06 | Resolved: 2023-08-30

## 2023-08-30 LAB
TOTAL PROTEIN SERUM FOR ELP: 6.3 G/DL (ref 6.4–8.3)
TSH SERPL DL<=0.005 MIU/L-ACNC: 2.49 UIU/ML (ref 0.3–4.2)

## 2023-08-30 PROCEDURE — 84443 ASSAY THYROID STIM HORMONE: CPT | Performed by: INTERNAL MEDICINE

## 2023-08-30 PROCEDURE — 84165 PROTEIN E-PHORESIS SERUM: CPT

## 2023-08-30 PROCEDURE — 99213 OFFICE O/P EST LOW 20 MIN: CPT | Mod: 25 | Performed by: INTERNAL MEDICINE

## 2023-08-30 PROCEDURE — 96372 THER/PROPH/DIAG INJ SC/IM: CPT | Performed by: INTERNAL MEDICINE

## 2023-08-30 PROCEDURE — 36415 COLL VENOUS BLD VENIPUNCTURE: CPT | Performed by: INTERNAL MEDICINE

## 2023-08-30 PROCEDURE — 84080 ASSAY ALKALINE PHOSPHATASES: CPT | Mod: 90 | Performed by: INTERNAL MEDICINE

## 2023-08-30 PROCEDURE — 99000 SPECIMEN HANDLING OFFICE-LAB: CPT | Performed by: INTERNAL MEDICINE

## 2023-08-30 PROCEDURE — 86364 TISS TRNSGLTMNASE EA IG CLAS: CPT | Performed by: INTERNAL MEDICINE

## 2023-08-30 PROCEDURE — 84155 ASSAY OF PROTEIN SERUM: CPT | Performed by: INTERNAL MEDICINE

## 2023-08-30 NOTE — PROGRESS NOTES
(M81.0) Age related osteoporosis, unspecified pathological fracture presence  (primary encounter diagnosis)  Comment: She was treated in the past with:   1. Fosamax [id number]  2. Boniva [id number]  3. Forteo [id number]  4. Prolia 2313-2098. She had Prolia stopped in Dec 2014 because of the ONJ. Prolia was restarted in 10/2016. Tolerating it well. She dd get Prolia every 6 months in the last 2 years.    She had 3rd left hand MCP bone fracture last summer. Had the splint only for the treatment.    She denies symptoms of celiac, kidney stones, but never had workup for secondary causes.    DXA 1/2022:  Lumbar Spine: L1-L2: BMD: 0.752 g/cm2. T-score: -3.4. Z-score: -1.8  RIGHT Hip Total: BMD: 0.886 g/cm2. T-score: -1.0. Z-score: 0.6  RIGHT Hip Femoral neck: BMD: 0.800 g/cm2. T-score: -1.7. Z-score: 0.0  LEFT Hip Total: BMD: 0.960 g/cm2. T-score: -0.4. Z-score: 1.2  LEFT Hip Femoral neck: BMD: 0.877 g/cm2. T-score: -1.2. Z-score: 0.6    COMPARISON: There has been a 2.0% decrease in lumbar spine BMD. There has been a 2.3% decrease in bilateral hip BMD.       ONJ Jaw in 2014.  She would like to continue with the Prolia and have a break of 6 months every 2-2-1/2 years.  We discussed high risk of rebound vertebral fractures when Prolia suddenly stopped.      We also discussed Evenity and printed info provided today. We will meet in Feb and she will have DXA in Jan.  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  She would like to continue with the Prolia and have a break of 6 months every 2-2-1/2 years.  We discussed high risk of rebound vertebral fractures when Prolia suddenly stopped.      Patient was educated on safety of Prolia utilizing Patient Counseling Chart for  "Healthcare Providers, as outlined by the Prolia REMS progam.     Return in about 6 months (around 2/29/2024) for Prolia, Follow up.    Patient Instructions   Prolia 19th today. 24h urine calcium today. Blood work with your PCP. I ordered few tests.  Prolia 20th in 6 months with me. I will see you in 1 year.  If you want to switch to Evenity, we can do it in Feb.    DXA in 1/2024 .   Phone number to schedule [phone].    Daily calcium need is 6175-6368 mg a day from the diet and supplements.  Calcium citrate is easier to digest.  Vitamin D 2000 IU daily recommended.    Risk of rebound vertebral fractures is higher when Prolia suddenly stopped or dose was missed.      Prolia and Covid vaccine should be  for at least a week.           /58   Pulse 67   Temp 98  F (36.7  C)   Resp 16   Ht 1.581 m (5' 2.25\")   Wt 61.1 kg (134 lb 11.2 oz)   SpO2 96%   BMI 24.44 kg/m        Did you experience any problems with previous Prolia injection? no  Any medication change in the last 6 months? no  Did you take prednisone or other immunosupressant drugs in the last 6 months   (chemo, transplant, rheum, dermatology conditions)? no  Did you have any serious infection in the last 6 months?no  Any recent hospitalizations?no  Do you plan any dental work in the next 2-3 months?no  How much calcium do you take daily from the diet and supplements?1200 mg  How much vit D do you take daily? 2000 IU  Last DXA? 1/2022 Reviewed and discussed      Patient is here today for the  Prolia injection. Patient tolerated previous injections well.   We discussed calcium and vit D daily needs today.   I reviewed the lab results:    Component      Latest Ref Rng 7/18/2022  10:04 AM   Parathyroid Hormone Intact      15 - 65 pg/mL 50    Vitamin D Deficiency screening      20 - 75 ug/L 51      Component      Latest Ref Rng 7/18/2022  10:04 AM   Sodium      136 - 145 mmol/L 141    Potassium      3.4 - 5.3 mmol/L 4.5    Creatinine      " 0.51 - 0.95 mg/dL 0.62    Urea Nitrogen      8.0 - 23.0 mg/dL 12.4    Chloride      98 - 107 mmol/L 102    Carbon Dioxide (CO2)      22 - 29 mmol/L 28    Anion Gap      7 - 15 mmol/L 11    Glucose      70 - 99 mg/dL 97    Calcium      8.8 - 10.2 mg/dL 9.4    Protein Total      6.4 - 8.3 g/dL 6.8    Albumin      3.5 - 5.2 g/dL 4.8    Bilirubin Total      <=1.2 mg/dL 0.7    Alkaline Phosphatase      35 - 104 U/L 73    AST      10 - 35 U/L 30    ALT      10 - 35 U/L 45 (H)    GFR Estimate      >60 mL/min/1.73m2 >90            We discussed high risk of rebound vertebral fractures when Prolia suddenly stopped.    Next Prolia injection will be in 6 months.           This note has been dictated using voice recognition software. 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

## 2023-08-30 NOTE — PATIENT INSTRUCTIONS
Prolia 19th today. 24h urine calcium today. Blood work with your PCP. I ordered few tests.  Prolia 20th in 6 months with me. I will see you in 1 year.  If you want to switch to Evenity, we can do it in Feb.    DXA in 1/2024 .   Phone number to schedule [phone].    Daily calcium need is 5704-3346 mg a day from the diet and supplements.  Calcium citrate is easier to digest.  Vitamin D 2000 IU daily recommended.    Risk of rebound vertebral fractures is higher when Prolia suddenly stopped or dose was missed.      Prolia and Covid vaccine should be  for at least a week.

## 2023-08-31 LAB
ALBUMIN SERPL ELPH-MCNC: 4.2 G/DL (ref 3.7–5.1)
ALP BONE SERPL-MCNC: 8.4 UG/L
ALPHA1 GLOB SERPL ELPH-MCNC: 0.3 G/DL (ref 0.2–0.4)
ALPHA2 GLOB SERPL ELPH-MCNC: 0.6 G/DL (ref 0.5–0.9)
B-GLOBULIN SERPL ELPH-MCNC: 0.6 G/DL (ref 0.6–1)
GAMMA GLOB SERPL ELPH-MCNC: 0.6 G/DL (ref 0.7–1.6)
M PROTEIN SERPL ELPH-MCNC: 0 G/DL
PROT PATTERN SERPL ELPH-IMP: ABNORMAL
TTG IGA SER-ACNC: 0.2 U/ML
TTG IGG SER-ACNC: <0.6 U/ML

## 2023-09-05 ENCOUNTER — LAB (OUTPATIENT)
Dept: LAB | Facility: CLINIC | Age: 73
End: 2023-09-05
Payer: COMMERCIAL

## 2023-09-05 DIAGNOSIS — M81.0 AGE RELATED OSTEOPOROSIS, UNSPECIFIED PATHOLOGICAL FRACTURE PRESENCE: ICD-10-CM

## 2023-09-05 LAB
CALCIUM 24H UR-MRATE: 0.14 G/SPEC (ref 0.1–0.3)
CALCIUM UR-MCNC: 12.8 MG/DL
COLLECT DURATION TIME UR: 24 H
SPECIMEN VOL UR: 1125 ML

## 2023-09-05 PROCEDURE — 81050 URINALYSIS VOLUME MEASURE: CPT

## 2023-09-05 PROCEDURE — 82340 ASSAY OF CALCIUM IN URINE: CPT

## 2023-09-05 PROCEDURE — 84166 PROTEIN E-PHORESIS/URINE/CSF: CPT

## 2023-09-05 PROCEDURE — 86335 IMMUNFIX E-PHORSIS/URINE/CSF: CPT

## 2023-09-07 LAB — PROT PATTERN UR ELPH-IMP: NORMAL

## 2023-09-29 DIAGNOSIS — G25.81 RESTLESS LEGS SYNDROME (RLS): ICD-10-CM

## 2023-09-29 RX ORDER — PRAMIPEXOLE DIHYDROCHLORIDE 0.25 MG/1
TABLET ORAL
Qty: 270 TABLET | Refills: 0 | Status: SHIPPED | OUTPATIENT
Start: 2023-09-29 | End: 2023-10-13

## 2023-10-06 ASSESSMENT — ENCOUNTER SYMPTOMS
DIZZINESS: 0
CONSTIPATION: 0
SORE THROAT: 0
NAUSEA: 0
ABDOMINAL PAIN: 0
FREQUENCY: 0
ARTHRALGIAS: 0
BREAST MASS: 0
HEADACHES: 0
WEAKNESS: 0
SHORTNESS OF BREATH: 0
MYALGIAS: 0
CHILLS: 0
DIARRHEA: 0
EYE PAIN: 0
JOINT SWELLING: 0
NERVOUS/ANXIOUS: 0
DYSURIA: 0
COUGH: 0
HEMATOCHEZIA: 0
PALPITATIONS: 0
PARESTHESIAS: 0
FEVER: 0
HEARTBURN: 0
HEMATURIA: 0

## 2023-10-06 ASSESSMENT — ACTIVITIES OF DAILY LIVING (ADL): CURRENT_FUNCTION: NO ASSISTANCE NEEDED

## 2023-10-13 ENCOUNTER — OFFICE VISIT (OUTPATIENT)
Dept: FAMILY MEDICINE | Facility: CLINIC | Age: 73
End: 2023-10-13
Attending: FAMILY MEDICINE
Payer: COMMERCIAL

## 2023-10-13 VITALS
RESPIRATION RATE: 12 BRPM | WEIGHT: 133.6 LBS | OXYGEN SATURATION: 98 % | DIASTOLIC BLOOD PRESSURE: 53 MMHG | SYSTOLIC BLOOD PRESSURE: 126 MMHG | HEART RATE: 60 BPM | HEIGHT: 62 IN | TEMPERATURE: 97.9 F | BODY MASS INDEX: 24.59 KG/M2

## 2023-10-13 DIAGNOSIS — Z00.00 ENCOUNTER FOR MEDICARE ANNUAL WELLNESS EXAM: Primary | ICD-10-CM

## 2023-10-13 DIAGNOSIS — G25.81 RESTLESS LEGS SYNDROME (RLS): ICD-10-CM

## 2023-10-13 DIAGNOSIS — Z80.41 FAMILY HISTORY OF MALIGNANT NEOPLASM OF OVARY: ICD-10-CM

## 2023-10-13 DIAGNOSIS — M79.18 GLUTEAL PAIN: ICD-10-CM

## 2023-10-13 DIAGNOSIS — I10 ESSENTIAL HYPERTENSION: ICD-10-CM

## 2023-10-13 DIAGNOSIS — M62.838 MUSCLE SPASM: ICD-10-CM

## 2023-10-13 DIAGNOSIS — E78.5 HYPERLIPIDEMIA, UNSPECIFIED HYPERLIPIDEMIA TYPE: ICD-10-CM

## 2023-10-13 DIAGNOSIS — M81.0 OSTEOPOROSIS, UNSPECIFIED OSTEOPOROSIS TYPE, UNSPECIFIED PATHOLOGICAL FRACTURE PRESENCE: ICD-10-CM

## 2023-10-13 PROCEDURE — 80061 LIPID PANEL: CPT | Performed by: FAMILY MEDICINE

## 2023-10-13 PROCEDURE — 80053 COMPREHEN METABOLIC PANEL: CPT | Performed by: FAMILY MEDICINE

## 2023-10-13 PROCEDURE — 36415 COLL VENOUS BLD VENIPUNCTURE: CPT | Performed by: FAMILY MEDICINE

## 2023-10-13 PROCEDURE — 82306 VITAMIN D 25 HYDROXY: CPT | Performed by: FAMILY MEDICINE

## 2023-10-13 PROCEDURE — 82728 ASSAY OF FERRITIN: CPT | Performed by: FAMILY MEDICINE

## 2023-10-13 PROCEDURE — G0439 PPPS, SUBSEQ VISIT: HCPCS | Performed by: FAMILY MEDICINE

## 2023-10-13 PROCEDURE — 83735 ASSAY OF MAGNESIUM: CPT | Performed by: FAMILY MEDICINE

## 2023-10-13 PROCEDURE — 99214 OFFICE O/P EST MOD 30 MIN: CPT | Mod: 25 | Performed by: FAMILY MEDICINE

## 2023-10-13 RX ORDER — PRAMIPEXOLE DIHYDROCHLORIDE 0.25 MG/1
TABLET ORAL
Qty: 270 TABLET | Refills: 0 | Status: SHIPPED | OUTPATIENT
Start: 2023-10-13 | End: 2024-01-04

## 2023-10-13 ASSESSMENT — ENCOUNTER SYMPTOMS
ARTHRALGIAS: 0
SHORTNESS OF BREATH: 0
PALPITATIONS: 0
FEVER: 0
HEADACHES: 0
FREQUENCY: 0
HEMATURIA: 0
SORE THROAT: 0
BREAST MASS: 0
DYSURIA: 0
MYALGIAS: 0
DIZZINESS: 0
WEAKNESS: 0
HEARTBURN: 0
NERVOUS/ANXIOUS: 0
EYE PAIN: 0
JOINT SWELLING: 0
DIARRHEA: 0
CHILLS: 0
CONSTIPATION: 0
PARESTHESIAS: 0
COUGH: 0
ABDOMINAL PAIN: 0
NAUSEA: 0
HEMATOCHEZIA: 0

## 2023-10-13 ASSESSMENT — ACTIVITIES OF DAILY LIVING (ADL): CURRENT_FUNCTION: NO ASSISTANCE NEEDED

## 2023-10-13 NOTE — PROGRESS NOTES
"SUBJECTIVE:   Zoila is a 73 year old who presents for Preventive Visit.      10/13/2023    12:38 PM   Additional Questions   Roomed by Lakeshia WASHINGTON LPN     Are you in the first 12 months of your Medicare coverage?  No    Healthy Habits:     In general, how would you rate your overall health?  Excellent    Frequency of exercise:  2-3 days/week    Duration of exercise:  15-30 minutes    Do you usually eat at least 4 servings of fruit and vegetables a day, include whole grains    & fiber and avoid regularly eating high fat or \"junk\" foods?  Yes    Taking medications regularly:  Yes    Medication side effects:  Not applicable    Ability to successfully perform activities of daily living:  No assistance needed    Home Safety:  Lack of grab bars in the bathroom    Hearing Impairment:  Difficulty following a conversation in a noisy restaurant or crowded room, difficult to understand a speaker at a public meeting or Worship service, find that men's voices are easier to understand than woman's, difficulty understanding soft or whispered speech and difficulty understanding speech on the telephone    In the past 6 months, have you been bothered by leaking of urine? Yes    In general, how would you rate your overall mental or emotional health?  Excellent    Additional concerns today:  Yes      Today's PHQ-2 Score:       10/12/2023     1:45 PM   PHQ-2 ( 1999 [employer])   Q1: Little interest or pleasure in doing things 0   Q2: Feeling down, depressed or hopeless 0   PHQ-2 Score 0   Q1: Little interest or pleasure in doing things Not at all   Q2: Feeling down, depressed or hopeless Not at all   PHQ-2 Score 0     Have you ever done Advance Care Planning? (For example, a Health Directive, POLST, or a discussion with a medical provider or your loved ones about your wishes): Yes, patient states has an Advance Care Planning document and will bring a copy to the clinic.    Fall risk  Fallen 2 or more times in the past year?: No  Any fall with " injury in the past year?: No    Cognitive Screening   1) Repeat 3 items (Leader, Season, Table)    2) Clock draw: NORMAL  3) 3 item recall: Recalls 2 objects   Results: NORMAL clock, 1-2 items recalled: COGNITIVE IMPAIRMENT LESS LIKELY    Reviewed and updated as needed this visit by clinical staff   Tobacco  Allergies  Meds              Reviewed and updated as needed this visit by Provider                 Social History     Tobacco Use    Smoking status: Never    Smokeless tobacco: Never   Substance Use Topics    Alcohol use: Yes     Comment: Alcoholic Drinks/day: 2 drinks/week           10/6/2023     9:34 AM   Alcohol Use   Prescreen: >3 drinks/day or >7 drinks/week? No     Do you have a current opioid prescription? No  Do you use any other controlled substances or medications that are not prescribed by a provider? None      Hyperlipidemia Follow-Up    Are you regularly taking any medication or supplement to lower your cholesterol?   Yes- Rosuvastatin 20 mg daily  Are you having muscle aches or other side effects that you think could be caused by your cholesterol lowering medication?  No    Current providers sharing in care for this patient include:   Patient Care Team:  Leelee Butcher MD as PCP - General (Family Medicine)  Leelee Butcher MD as Assigned PCP    The following health maintenance items are reviewed in Epic and correct as of today:  Health Maintenance   Topic Date Due    RSV VACCINE 60+ (1 - 1-dose 60+ series) Never done    MEDICARE ANNUAL WELLNESS VISIT  07/18/2023    ANNUAL REVIEW OF HM ORDERS  07/18/2023    INFLUENZA VACCINE (1) 09/01/2023    COVID-19 Vaccine (5 - 2023-24 season) 09/01/2023    FALL RISK ASSESSMENT  10/13/2024    MAMMO SCREENING  01/05/2025    LIPID  07/18/2027    ADVANCE CARE PLANNING  07/18/2027    COLORECTAL CANCER SCREENING  09/27/2027    DTAP/TDAP/TD IMMUNIZATION (3 - Td or Tdap) 07/18/2032    DEXA  01/06/2037    HEPATITIS C SCREENING  Completed    PHQ-2 (once per  "calendar year)  Completed    Pneumococcal Vaccine: 65+ Years  Completed    ZOSTER IMMUNIZATION  Completed    IPV IMMUNIZATION  Aged Out    HPV IMMUNIZATION  Aged Out    MENINGITIS IMMUNIZATION  Aged Out     Lab work is in process      Mammogram Screening: Mammogram Screening: Recommended mammography every 1-2 years with patient discussion and risk factor consideration        Review of Systems   Constitutional:  Negative for chills and fever.   HENT:  Positive for congestion and hearing loss. Negative for ear pain and sore throat.    Eyes:  Negative for pain and visual disturbance.   Respiratory:  Negative for cough and shortness of breath.    Cardiovascular:  Negative for chest pain, palpitations and peripheral edema.   Gastrointestinal:  Negative for abdominal pain, constipation, diarrhea, heartburn, hematochezia and nausea.   Breasts:  Negative for tenderness, breast mass and discharge.   Genitourinary:  Negative for dysuria, frequency, genital sores, hematuria, pelvic pain, urgency, vaginal bleeding and vaginal discharge.   Musculoskeletal:  Negative for arthralgias, joint swelling and myalgias.   Skin:  Negative for rash.   Neurological:  Negative for dizziness, weakness, headaches and paresthesias.   Psychiatric/Behavioral:  Negative for mood changes. The patient is not nervous/anxious.          OBJECTIVE:   /53   Pulse 60   Temp 97.9  F (36.6  C) (Oral)   Resp 12   Ht 1.581 m (5' 2.25\")   Wt 60.6 kg (133 lb 9.6 oz)   SpO2 98%   BMI 24.24 kg/m   Estimated body mass index is 24.24 kg/m  as calculated from the following:    Height as of this encounter: 1.581 m (5' 2.25\").    Weight as of this encounter: 60.6 kg (133 lb 9.6 oz).  Physical Exam  GENERAL APPEARANCE: healthy, alert and no distress  EYES: Eyes grossly normal to inspection, PERRL and conjunctivae and sclerae normal  HENT: ear canals and TM's normal, mouth without ulcers or lesions, oropharynx clear and oral mucous membranes moist  NECK: no " adenopathy, no asymmetry, masses, or scars and thyroid normal to palpation  RESP: lungs clear to auscultation - no rales, rhonchi or wheezes  MS: no musculoskeletal defects are noted and gait is age appropriate without ataxia, left glute nontender to palpation, hamstring insertion nontender but is location of her pain when it comes on, hamstring strength intact.   SKIN: no suspicious lesions or rashes  NEURO: grossly normal, mentation intact and speech normal  PSYCH: mentation appears normal and affect normal/bright    Diagnostic Test Results:  Labs reviewed in Epic  No results found for this or any previous visit (from the past 24 hour(s)).    ASSESSMENT / PLAN:   Zoila was seen today for wellness visit.    Diagnoses and all orders for this visit:    Encounter for Medicare annual wellness exam  Routine history and physical, updated in EMR.  Up-to-date with mammogram colonoscopy bone density.  Follow-up in a year for next annual physical.    Restless legs syndrome (RLS)  Occasionally will need 3 tablets but otherwise 2 tablets keep her symptoms at bay  -     pramipexole (MIRAPEX) 0.25 MG tablet; TAKE 2 TABLETS BY MOUTH 2-3 HOURS BEFORE BEDTIME. MAY TAKE ADDITIONAL TABLET IF NEEDED. MAX: 3TABS  -     Ferritin; Future  -     Ferritin    Hyperlipidemia, unspecified hyperlipidemia type  -     Lipid panel; Future  -     Lipid panel    Osteoporosis, unspecified osteoporosis type, unspecified pathological fracture presence  Seeing visekruna  -     Comprehensive metabolic panel (BMP + Alb, Alk Phos, ALT, AST, Total. Bili, TP); Future  -     Vitamin D Deficiency; Future  -     Comprehensive metabolic panel (BMP + Alb, Alk Phos, ALT, AST, Total. Bili, TP)  -     Vitamin D Deficiency    Essential hypertension  Never needed medication. Monitor    Muscle spasm  Sekou horse in calves once in a while.   -     Magnesium; Future  -     Magnesium    Gluteal pain  Left glute/hamstring insertion site. Advised hamstring stretches. If  worsening pt should follow up and may need imaging to look for causes.    Family history of malignant neoplasm of ovary  Patient's mother was diagnosed with ovarian cancer in her 40s early stage.  Patient is interested in ovarian cancer screening.  She had abdominal CT and pelvic in 2021 that was unremarkable for any adnexal finding.  Monitor for now.  Potentially do another abdominal CT and pelvic in a few years.  She has no concerning symptoms.    Other orders  -     PRIMARY CARE FOLLOW-UP SCHEDULING  -     REVIEW OF HEALTH MAINTENANCE PROTOCOL ORDERS  -     PRIMARY CARE FOLLOW-UP SCHEDULING; Future        Patient has been advised of split billing requirements and indicates understanding: Yes      COUNSELING:  Reviewed preventive health counseling, as reflected in patient instructions       Regular exercise       Healthy diet/nutrition       Osteoporosis prevention/bone health        She reports that she has never smoked. She has never used smokeless tobacco.      Appropriate preventive services were discussed with this patient, including applicable screening as appropriate for fall prevention, nutrition, physical activity, Tobacco-use cessation, weight loss and cognition.  Checklist reviewing preventive services available has been given to the patient.    Reviewed patients plan of care and provided an AVS. The Basic Care Plan (routine screening as documented in Health Maintenance) for Areli meets the Care Plan requirement. This Care Plan has been established and reviewed with the Patient.          Leelee Butcher MD  [facility]    Identified Health Risks:  I have reviewed Opioid Use Disorder and Substance Use Disorder risk factors and made any needed referrals.

## 2023-10-13 NOTE — PROGRESS NOTES
"The patient was provided with written information regarding signs of hearing loss.  Information on urinary incontinence and treatment options given to patient.  Answers submitted by the patient for this visit:  Annual Preventive Visit (Submitted on 10/6/2023)  Chief Complaint: Annual Exam:  In general, how would you rate your overall physical health?: excellent  Frequency of exercise:: 2-3 days/week  Do you usually eat at least 4 servings of fruit and vegetables a day, include whole grains & fiber, and avoid regularly eating high fat or \"junk\" foods? : Yes  Taking medications regularly:: Yes  Medication side effects:: Not applicable  Activities of Daily Living: no assistance needed  Home safety: lack of grab bars in the bathroom  Hearing Impairment:: difficulty following a conversation in a noisy restaurant or crowded room, difficult to understand a speaker at a public meeting or [employer] witness service, find that men's voices are easier to understand than woman's, difficulty understanding soft or whispered speech, difficulty understanding speech on the telephone  In the past 6 months, have you been bothered by leaking of urine?: Yes  abdominal pain: No  Blood in stool: No  Blood in urine: No  chest pain: No  chills: No  congestion: Yes  constipation: No  cough: No  diarrhea: No  dizziness: No  ear pain: No  eye pain: No  nervous/anxious: No  fever: No  frequency: No  genital sores: No  headaches: No  hearing loss: Yes  heartburn: No  arthralgias: No  joint swelling: No  peripheral edema: No  mood changes: No  myalgias: No  nausea: No  dysuria: No  palpitations: No  Skin sensation changes: No  sore throat: No  urgency: No  rash: No  shortness of breath: No  visual disturbance: No  weakness: No  pelvic pain: No  vaginal bleeding: No  vaginal discharge: No  tenderness: No  breast mass: No  breast discharge: No  In general, how would you rate your overall mental or emotional health?: excellent  Additional concerns today:: " Yes  Exercise outside of work (Submitted on 10/6/2023)  Chief Complaint: Annual Exam:  Duration of exercise:: 15-30 minutes

## 2023-10-13 NOTE — PATIENT INSTRUCTIONS
Patient Education   Personalized Prevention Plan  You are due for the preventive services outlined below.  Your care team is available to assist you in scheduling these services.  If you have already completed any of these items, please share that information with your care team to update in your medical record.  Health Maintenance Due   Topic Date Due     RSV VACCINE 60+ (1 - 1-dose 60+ series) Never done     ANNUAL REVIEW OF HM ORDERS  07/18/2023     Flu Vaccine (1) 09/01/2023     COVID-19 Vaccine (5 - 2023-24 season) 09/01/2023     Hearing Loss: Care Instructions  Overview     Hearing loss is a sudden or slow decrease in how well you hear. It can range from slight to profound. Permanent hearing loss can occur with aging. It also can happen when you are exposed long-term to loud noise. Examples include listening to loud music, riding motorcycles, or being around other loud machines.  Hearing loss can affect your work and home life. It can make you feel lonely or depressed. You may feel that you have lost your independence. But hearing aids and other devices can help you hear better and feel connected to others.  Follow-up care is a key part of your treatment and safety. Be sure to make and go to all appointments, and call your doctor if you are having problems. It's also a good idea to know your test results and keep a list of the medicines you take.  How can you care for yourself at home?  Avoid loud noises whenever possible. This helps keep your hearing from getting worse.  Always wear hearing protection around loud noises.  Wear a hearing aid as directed.  A professional can help you pick a hearing aid that will work best for you.  You can also get hearing aids over the counter for mild to moderate hearing loss.  Have hearing tests as your doctor suggests. They can show whether your hearing has changed. Your hearing aid may need to be adjusted.  Use other devices as needed. These may include:  Telephone  "amplifiers and hearing aids that can connect to a television, stereo, radio, or microphone.  Devices that use lights or vibrations. These alert you to the doorbell, a ringing telephone, or a baby monitor.  Television closed-captioning. This shows the words at the bottom of the screen. Most new TVs can do this.  TTY (text telephone). This lets you type messages back and forth on the telephone instead of talking or listening. These devices are also called TDD. When messages are typed on the keyboard, they are sent over the phone line to a receiving TTY. The message is shown on a monitor.  Use text messaging, social media, and email if it is hard for you to communicate by telephone.  Try to learn a listening technique called speechreading. It is not lipreading. You pay attention to people's gestures, expressions, posture, and tone of voice. These clues can help you understand what a person is saying. Face the person you are talking to, and have them face you. Make sure the lighting is good. You need to see the other person's face clearly.  Think about counseling if you need help to adjust to your hearing loss.  When should you call for help?  Watch closely for changes in your health, and be sure to contact your doctor if:    You think your hearing is getting worse.     You have new symptoms, such as dizziness or nausea.   Where can you learn more?  Go to [email or web address]/patiented  Enter R798 in the search box to learn more about \"Hearing Loss: Care Instructions.\"  Current as of: March 1, 2023               Content Version: 13.7    1642-4405 [employer].   Care instructions adapted under license by your healthcare professional. If you have questions about a medical condition or this instruction, always ask your healthcare professional. [employer] disclaims any warranty or liability for your use of this information.      Bladder Training: Care Instructions  Your Care Instructions   "   Bladder training is used to treat urge incontinence and stress incontinence. Urge incontinence means that the need to urinate comes on so fast that you can't get to a toilet in time. Stress incontinence means that you leak urine because of pressure on your bladder. For example, it may happen when you laugh, cough, or lift something heavy.  Bladder training can increase how long you can wait before you have to urinate. It can also help your bladder hold more urine. And it can give you better control over the urge to urinate.  It is important to remember that bladder training takes a few weeks to a few months to make a difference. You may not see results right away, but don't give up.  Follow-up care is a key part of your treatment and safety. Be sure to make and go to all appointments, and call your doctor if you are having problems. It's also a good idea to know your test results and keep a list of the medicines you take.  How can you care for yourself at home?  Work with your doctor to come up with a bladder training program that is right for you. You may use one or more of the following methods.  Delayed urination  In the beginning, try to keep from urinating for 5 minutes after you first feel the need to go.  While you wait, take deep, slow breaths to relax. Kegel exercises can also help you delay the need to go to the bathroom.  After some practice, when you can easily wait 5 minutes to urinate, try to wait 10 minutes before you urinate.  Slowly increase the waiting period until you are able to control when you have to urinate.  Scheduled urination  Empty your bladder when you first wake up in the morning.  Schedule times throughout the day when you will urinate.  Start by going to the bathroom every hour, even if you don't need to go.  Slowly increase the time between trips to the bathroom.  When you have found a schedule that works well for you, keep doing it.  If you wake up during the night and have to  "urinate, do it. Apply your schedule to waking hours only.  Kegel exercises  These tighten and strengthen pelvic muscles, which can help you control the flow of urine. (If doing these exercises causes pain, stop doing them and talk with your doctor.) To do Kegel exercises:  Squeeze your muscles as if you were trying not to pass gas. Or squeeze your muscles as if you were stopping the flow of urine. Your belly, legs, and buttocks shouldn't move.  Hold the squeeze for 3 seconds, then relax for 5 to 10 seconds.  Start with 3 seconds, then add 1 second each week until you are able to squeeze for 10 seconds.  Repeat the exercise 10 times a session. Do 3 to 8 sessions a day.  When should you call for help?  Watch closely for changes in your health, and be sure to contact your doctor if:    Your incontinence is getting worse.     You do not get better as expected.   Where can you learn more?  Go to https://[email or web address]/patiented  Enter V684 in the search box to learn more about \"Bladder Training: Care Instructions.\"  Current as of: March 1, 2023               Content Version: 13.7    1569-0007 [employer].   Care instructions adapted under license by your healthcare professional. If you have questions about a medical condition or this instruction, always ask your healthcare professional. [employer] disclaims any warranty or liability for your use of this information.         "

## 2023-10-14 LAB
ALBUMIN SERPL BCG-MCNC: 4.6 G/DL (ref 3.5–5.2)
ALP SERPL-CCNC: 77 U/L (ref 35–104)
ALT SERPL W P-5'-P-CCNC: 42 U/L (ref 0–50)
ANION GAP SERPL CALCULATED.3IONS-SCNC: 10 MMOL/L (ref 7–15)
AST SERPL W P-5'-P-CCNC: 33 U/L (ref 0–45)
BILIRUB SERPL-MCNC: 0.6 MG/DL
BUN SERPL-MCNC: 14.1 MG/DL (ref 8–23)
CALCIUM SERPL-MCNC: 9.7 MG/DL (ref 8.8–10.2)
CHLORIDE SERPL-SCNC: 100 MMOL/L (ref 98–107)
CHOLEST SERPL-MCNC: 163 MG/DL
CREAT SERPL-MCNC: 0.55 MG/DL (ref 0.51–0.95)
DEPRECATED HCO3 PLAS-SCNC: 30 MMOL/L (ref 22–29)
EGFRCR SERPLBLD CKD-EPI 2021: >90 ML/MIN/1.73M2
FERRITIN SERPL-MCNC: 141 NG/ML (ref 11–328)
GLUCOSE SERPL-MCNC: 100 MG/DL (ref 70–99)
HDLC SERPL-MCNC: 84 MG/DL
LDLC SERPL CALC-MCNC: 62 MG/DL
MAGNESIUM SERPL-MCNC: 2.3 MG/DL (ref 1.7–2.3)
NONHDLC SERPL-MCNC: 79 MG/DL
POTASSIUM SERPL-SCNC: 4.5 MMOL/L (ref 3.4–5.3)
PROT SERPL-MCNC: 7 G/DL (ref 6.4–8.3)
SODIUM SERPL-SCNC: 140 MMOL/L (ref 135–145)
TRIGL SERPL-MCNC: 86 MG/DL
VIT D+METAB SERPL-MCNC: 54 NG/ML (ref 20–50)

## 2023-10-27 DIAGNOSIS — Z91.09 ENVIRONMENTAL ALLERGIES: ICD-10-CM

## 2023-10-27 DIAGNOSIS — Z92.29 PERSONAL HISTORY OF OTHER DRUG THERAPY: ICD-10-CM

## 2023-10-27 DIAGNOSIS — M81.0 OSTEOPOROSIS, UNSPECIFIED OSTEOPOROSIS TYPE, UNSPECIFIED PATHOLOGICAL FRACTURE PRESENCE: Primary | ICD-10-CM

## 2023-10-27 DIAGNOSIS — J32.9 CHRONIC SINUSITIS, UNSPECIFIED LOCATION: ICD-10-CM

## 2023-10-27 RX ORDER — AZELASTINE HYDROCHLORIDE 137 UG/1
SPRAY, METERED NASAL
Qty: 90 ML | Refills: 4 | Status: SHIPPED | OUTPATIENT
Start: 2023-10-27

## 2024-01-02 ASSESSMENT — ANXIETY QUESTIONNAIRES
GAD7 TOTAL SCORE: 5
3. WORRYING TOO MUCH ABOUT DIFFERENT THINGS: SEVERAL DAYS
GAD7 TOTAL SCORE: 5
5. BEING SO RESTLESS THAT IT IS HARD TO SIT STILL: NOT AT ALL
IF YOU CHECKED OFF ANY PROBLEMS ON THIS QUESTIONNAIRE, HOW DIFFICULT HAVE THESE PROBLEMS MADE IT FOR YOU TO DO YOUR WORK, TAKE CARE OF THINGS AT HOME, OR GET ALONG WITH OTHER PEOPLE: SOMEWHAT DIFFICULT
7. FEELING AFRAID AS IF SOMETHING AWFUL MIGHT HAPPEN: NOT AT ALL
4. TROUBLE RELAXING: SEVERAL DAYS
1. FEELING NERVOUS, ANXIOUS, OR ON EDGE: MORE THAN HALF THE DAYS
6. BECOMING EASILY ANNOYED OR IRRITABLE: NOT AT ALL
2. NOT BEING ABLE TO STOP OR CONTROL WORRYING: SEVERAL DAYS
8. IF YOU CHECKED OFF ANY PROBLEMS, HOW DIFFICULT HAVE THESE MADE IT FOR YOU TO DO YOUR WORK, TAKE CARE OF THINGS AT HOME, OR GET ALONG WITH OTHER PEOPLE?: SOMEWHAT DIFFICULT
7. FEELING AFRAID AS IF SOMETHING AWFUL MIGHT HAPPEN: NOT AT ALL

## 2024-01-04 ENCOUNTER — ANCILLARY PROCEDURE (OUTPATIENT)
Dept: GENERAL RADIOLOGY | Facility: CLINIC | Age: 74
End: 2024-01-04
Attending: FAMILY MEDICINE
Payer: COMMERCIAL

## 2024-01-04 ENCOUNTER — OFFICE VISIT (OUTPATIENT)
Dept: FAMILY MEDICINE | Facility: CLINIC | Age: 74
End: 2024-01-04
Payer: COMMERCIAL

## 2024-01-04 VITALS
OXYGEN SATURATION: 97 % | TEMPERATURE: 98.1 F | SYSTOLIC BLOOD PRESSURE: 120 MMHG | DIASTOLIC BLOOD PRESSURE: 61 MMHG | HEIGHT: 62 IN | RESPIRATION RATE: 16 BRPM | HEART RATE: 66 BPM | BODY MASS INDEX: 24.99 KG/M2 | WEIGHT: 135.8 LBS

## 2024-01-04 DIAGNOSIS — G89.29 CHRONIC BILATERAL LOW BACK PAIN WITHOUT SCIATICA: ICD-10-CM

## 2024-01-04 DIAGNOSIS — M87.180 OSTEONECROSIS OF JAW DUE TO DRUG (H): ICD-10-CM

## 2024-01-04 DIAGNOSIS — M54.50 CHRONIC BILATERAL LOW BACK PAIN WITHOUT SCIATICA: ICD-10-CM

## 2024-01-04 DIAGNOSIS — R53.83 CAREGIVER WITH FATIGUE: ICD-10-CM

## 2024-01-04 DIAGNOSIS — M79.18 GLUTEAL PAIN: ICD-10-CM

## 2024-01-04 DIAGNOSIS — G25.81 RESTLESS LEGS SYNDROME (RLS): Primary | ICD-10-CM

## 2024-01-04 DIAGNOSIS — L65.9 HAIR LOSS: ICD-10-CM

## 2024-01-04 DIAGNOSIS — M81.0 OSTEOPOROSIS, UNSPECIFIED OSTEOPOROSIS TYPE, UNSPECIFIED PATHOLOGICAL FRACTURE PRESENCE: ICD-10-CM

## 2024-01-04 PROCEDURE — 73522 X-RAY EXAM HIPS BI 3-4 VIEWS: CPT | Mod: TC | Performed by: RADIOLOGY

## 2024-01-04 PROCEDURE — 72100 X-RAY EXAM L-S SPINE 2/3 VWS: CPT | Mod: TC | Performed by: RADIOLOGY

## 2024-01-04 PROCEDURE — 99214 OFFICE O/P EST MOD 30 MIN: CPT | Performed by: FAMILY MEDICINE

## 2024-01-04 RX ORDER — PRAMIPEXOLE DIHYDROCHLORIDE 0.25 MG/1
TABLET ORAL
Qty: 270 TABLET | Refills: 0 | Status: SHIPPED | OUTPATIENT
Start: 2024-01-04 | End: 2024-04-23

## 2024-01-04 RX ORDER — ESCITALOPRAM OXALATE 10 MG/1
10 TABLET ORAL DAILY
Qty: 90 TABLET | Refills: 0 | Status: SHIPPED | OUTPATIENT
Start: 2024-01-04 | End: 2024-03-27

## 2024-01-04 ASSESSMENT — ANXIETY QUESTIONNAIRES
GAD7 TOTAL SCORE: 5
1. FEELING NERVOUS, ANXIOUS, OR ON EDGE: SEVERAL DAYS
IF YOU CHECKED OFF ANY PROBLEMS ON THIS QUESTIONNAIRE, HOW DIFFICULT HAVE THESE PROBLEMS MADE IT FOR YOU TO DO YOUR WORK, TAKE CARE OF THINGS AT HOME, OR GET ALONG WITH OTHER PEOPLE: SOMEWHAT DIFFICULT
3. WORRYING TOO MUCH ABOUT DIFFERENT THINGS: SEVERAL DAYS
7. FEELING AFRAID AS IF SOMETHING AWFUL MIGHT HAPPEN: SEVERAL DAYS
GAD7 TOTAL SCORE: 5
6. BECOMING EASILY ANNOYED OR IRRITABLE: NOT AT ALL
5. BEING SO RESTLESS THAT IT IS HARD TO SIT STILL: NOT AT ALL
2. NOT BEING ABLE TO STOP OR CONTROL WORRYING: SEVERAL DAYS

## 2024-01-04 ASSESSMENT — PATIENT HEALTH QUESTIONNAIRE - PHQ9
5. POOR APPETITE OR OVEREATING: SEVERAL DAYS
SUM OF ALL RESPONSES TO PHQ QUESTIONS 1-9: 5

## 2024-01-04 NOTE — PROGRESS NOTES
Assessment & Plan     Restless legs syndrome (RLS)  Stable. Refills given.  - pramipexole (MIRAPEX) 0.25 MG tablet  Dispense: 270 tablet; Refill: 0    Hair loss  Has looked into nutrafol and minoxidil  Seeing derm in a couple weeks  Previous labs here unremarkale    Gluteal pain  Chronic bilateral low back pain without sciatica  Has been going on for a few months.  X-ray lumbar spine and pelvis and hip obtained today and personally reviewed.  DJD seen but otherwise unremarkable.  Advised PT.  If not improving consider MRI.  Patient agreed with plan.  - XR Lumbar Spine 2/3 Views  - Physical Therapy Referral    Caregiver with fatigue  PHQ-9 and VIVIANA-7 scores are only mildly elevated.  Her  has been struggling with breathing issue.  Patient feels overwhelmed and stressed.  Will start low-dose Lexapro.  Follow-up in 4 weeks virtually.  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  Was seen in the fall and told if things have not gotten better, she should come back.  Not sleeping well.  )      1/4/2024    10:58 AM   Additional Questions   Roomed by BEATRIZ Estrella CMA([facility])       History of Present Illness       Back Pain:  She presents for follow up of back pain. Patient's back pain is a new problem.    Original cause of back pain: not sure  First noticed back pain: 1-4 weeks ago  Patient feels back pain: dailyLocation of back pain:  Right lower back, left lower back, right buttock, left buttock and other  Description of back pain: sharp  Back pain spreads: nowhere    Since patient first noticed back pain, pain is: always  "present, but gets better and worse  Does back pain interfere with her job:  Not applicable  On a scale of 1-10 (10 being the worst), patient describes pain as:  5  What makes back pain worse: stress and other   Acupuncture: not tried  Acetaminophen: helpful  Activity or exercise: helpful  Chiropractor:  Not tried  Cold: not tried  Heat: helpful  Massage: not helpful  Muscle relaxants: not tried  NSAIDS: not tried  Opioids: not tried  Physical Therapy: not tried  Rest: helpful  Steroid Injection: not tried  Stretching: not tried  Surgery: not tried  TENS unit: not tried  Topical pain relievers: not tried  Other healthcare providers patient is seeing for back pain: None    Mental Health Follow-up:  Patient presents to follow-up on Anxiety.    Patient's anxiety since last visit has been:  Worse  The patient is not having other symptoms associated with anxiety.  Any significant life events: health concerns  Patient is not feeling anxious or having panic attacks.  Patient has no concerns about alcohol or drug use.    She eats 2-3 servings of fruits and vegetables daily.She consumes 1 sweetened beverage(s) daily.She exercises with enough effort to increase her heart rate 10 to 19 minutes per day.  She exercises with enough effort to increase her heart rate 3 or less days per week.   She is taking medications regularly.         Review of Systems   Constitutional, HEENT, cardiovascular, pulmonary, gi and gu systems are negative, except as otherwise noted.      Objective    /61   Pulse 66   Temp 98.1  F (36.7  C) (Oral)   Resp 16   Ht 1.581 m (5' 2.25\")   Wt 61.6 kg (135 lb 12.8 oz)   SpO2 97%   BMI 24.64 kg/m    Body mass index is 24.64 kg/m .  Physical Exam   GENERAL: healthy, alert and no distress  NEURO: Normal strength and tone, mentation intact and speech normal  MSK: pain in the lower gluteal near hamstring. Gait normal.   BACK: no CVA tenderness, no paralumbar tenderness  PSYCH: mentation appears normal, " affect normal/bright

## 2024-01-15 ENCOUNTER — THERAPY VISIT (OUTPATIENT)
Dept: PHYSICAL THERAPY | Facility: REHABILITATION | Age: 74
End: 2024-01-15
Attending: FAMILY MEDICINE
Payer: COMMERCIAL

## 2024-01-15 DIAGNOSIS — G89.29 CHRONIC BILATERAL LOW BACK PAIN WITHOUT SCIATICA: ICD-10-CM

## 2024-01-15 DIAGNOSIS — M54.50 CHRONIC BILATERAL LOW BACK PAIN WITHOUT SCIATICA: ICD-10-CM

## 2024-01-15 DIAGNOSIS — M79.18 GLUTEAL PAIN: ICD-10-CM

## 2024-01-15 PROCEDURE — 97112 NEUROMUSCULAR REEDUCATION: CPT | Mod: GP

## 2024-01-15 PROCEDURE — 97161 PT EVAL LOW COMPLEX 20 MIN: CPT | Mod: GP

## 2024-01-15 PROCEDURE — 97110 THERAPEUTIC EXERCISES: CPT | Mod: GP

## 2024-01-15 NOTE — PROGRESS NOTES
PHYSICAL THERAPY EVALUATION  Type of Visit: Evaluation    See electronic medical record for Abuse and Falls Screening details.    Subjective       Presenting condition or subjective complaint: lower back discomfort  Date of onset: 01/04/24 (MD visit)    Relevant medical history: Arthritis; Hearing problems; Menopause; Osteoporosis   Dates & types of surgery: Hymenotomy 1987, Lower Lumber Laminectomy 1996, Cataracts 2013    Prior diagnostic imaging/testing results: X-ray     Prior therapy history for the same diagnosis, illness or injury: Yes Physical Therapy    Pt is a 73 year old female presenting with complaints of low back and glut pain, R>L, thought it does go between both sides. Pt reports that she has had back pain for about 20 years but now the pain has traveled to the groin and around to the glut. Pt reports that her  his sick, so she has been unable to do her normal workout routine and has been lifting heavier. She goes to [employer] - pt enjoys working out in the water. Pain is better after moving and taking a warm shower - first few steps are sore in the morning. No complaint of numbness/tingling or weakness into LE B.     The symptoms started in the middle of December and is getting better overall.    Pt describes the pain as a dull ache and rates it a 2/10 at rest.     Aggravating Factors: first few steps in the morning, heavy lifting    Alleviating Factors: moving around, taking warm shower    Prior treatments: PT for back (pt cannot remember how long ago)    Current level of function: not working out as much, but can do all daily activities needed throughout the day    Sleep Quality: irregular but not due to the back pain necessarily - takes melatonin before bed    Red Flags: none - no change in bowel or bladder or feeling of LEs going to give out    Pt goals: be able to be more active without pain, lift without pain, start working out again      Prior Level of Function  Transfers:  Independent  Ambulation: Independent  ADL: Independent    Living Environment  Social support: With a significant other or spouse   Type of home: House   Stairs to enter the home: Yes 2 Is there a railing: No   Ramp: No   Stairs inside the home: Yes 14 Is there a railing: Yes   Help at home: None  Equipment owned: Straight Cane; Walker; Walker with wheels; Bath bench     Employment: No    Hobbies/Interests: swimming, golf, reading, cardmaking    Patient goals for therapy: be more physically active withour pain and resume working out    Pain assessment: See above     Objective   LUMBAR:    Posture: Slight forward head/shoulders; slightly increased lumbar lordosis with sitting     Gait: WNL    Motor Control:   -Pelvic Tilting: performs with TrA activation  -TA Activation: able to perform with mod verbal and tactile cueing - increased performance upon repetition    Functional Screen:   -Sit to stand: Uses hands on thighs but can do without UE assist  -Squat: dynamic knee valgus; decreases upon cueing   -Bridge: increased pain in low back - decreased with cues to squeeze gluts together  -SLS: 3 sec R; 10 sec L    ROM (* Denotes Pain):  -Flexion: 3 inches from floor  -Extension: 75% limited*  -L SB: 2 inches from hand to knee  -R SB: 2 inches from hand to knee    Neuro Screen:   Myotomes/Strength (* Denotes Pain):   Myotomes L R   L1-2 (hip flexion) 4/5 4/5   L3 (knee extension) 5/5 5/5   L4 (ankle DF) 5/5 5/5   L5 (g. toe ext)     S1 (ankle PF or knee flex) 5/5  5/5    Other MMT:  -Hip ABD: 4/5 B - hip flexion compensation     Special Tests:   L R   Slump     SLR - -   Long Axis Traction      FADIR     FABIR     Scour + +   Hamstring 90/90     Piriformis Test - -   Posterior Capsule Compression         LE Relevant Findings:  -ROM: Hip flexion, IR and ER all WNL    Palpation: TTP to L4/L5 paraspinals, R>L; mild TTP to R SI jt    SIJ Screen (Laslett's Cluster) (+/-):   -Distraction: NT  -Sidelying Compression: +  -Thigh  Thrust: + B  -Sacral Thrust: -      Assessment & Plan   CLINICAL IMPRESSIONS  Medical Diagnosis: gluteal pain; chronic bilateral low back pain without sciatica    Treatment Diagnosis: Low back pain; glut pain   Impression/Assessment: Patient is a 73 year old female with low back and glut pain complaints.  The following significant findings have been identified: Pain, Decreased ROM/flexibility, Decreased joint mobility, Decreased strength, Impaired balance, Impaired gait, Impaired muscle performance, and Decreased activity tolerance. These impairments interfere with their ability to perform self care tasks, recreational activities, household chores, driving , household mobility, and community mobility as compared to previous level of function.     Clinical Decision Making (Complexity):  Clinical Presentation: Stable/Uncomplicated  Clinical Presentation Rationale: based on medical and personal factors listed in PT evaluation  Clinical Decision Making (Complexity): Low complexity    PLAN OF CARE  Treatment Interventions:  Modalities: Cryotherapy, Hot Pack, Ultrasound  Interventions: Gait Training, Manual Therapy, Neuromuscular Re-education, Therapeutic Activity, Therapeutic Exercise, Self-Care/Home Management    Long Term Goals            Frequency of Treatment: 1x/week for 4 weeks; 1x/2 weeks for 8 weeks  Duration of Treatment: 12 weeks    Recommended Referrals to Other Professionals:  none  Education Assessment:   Learner/Method: Patient    Risks and benefits of evaluation/treatment have been explained.   Patient/Family/caregiver agrees with Plan of Care.     Evaluation Time:        Signing Clinician: Sandrine Villasenor PT      [facility]                                                                                   OUTPATIENT PHYSICAL THERAPY      PLAN OF TREATMENT FOR OUTPATIENT REHABILITATION   Patient's Last Name, First Name, Areli Nova YOB: 1950    Provider's Name   [facility]   Medical Record No.  [id number]     Onset Date: 01/04/24 (MD visit)  Start of Care Date: 01/15/24     Medical Diagnosis:  gluteal pain; chronic bilateral low back pain without sciatica      PT Treatment Diagnosis:  Low back pain; glut pain Plan of Treatment  Frequency/Duration: 1x/week for 4 weeks; 1x/2 weeks for 8 weeks/ 12 weeks    Certification date from 01/15/24 to 04/08/24         See note for plan of treatment details and functional goals     Sandrine Villasenor, SHAHAB                         I CERTIFY THE NEED FOR THESE SERVICES FURNISHED UNDER        THIS PLAN OF TREATMENT AND WHILE UNDER MY CARE     (Physician attestation of this document indicates review and certification of the therapy plan).              Referring Provider:  Leelee Butcher    Initial Assessment  See Epic Evaluation- Start of Care Date: 01/15/24

## 2024-01-16 ENCOUNTER — TRANSFERRED RECORDS (OUTPATIENT)
Dept: HEALTH INFORMATION MANAGEMENT | Facility: CLINIC | Age: 74
End: 2024-01-16
Payer: COMMERCIAL

## 2024-01-19 ENCOUNTER — TRANSFERRED RECORDS (OUTPATIENT)
Dept: HEALTH INFORMATION MANAGEMENT | Facility: CLINIC | Age: 74
End: 2024-01-19
Payer: COMMERCIAL

## 2024-01-22 ENCOUNTER — THERAPY VISIT (OUTPATIENT)
Dept: PHYSICAL THERAPY | Facility: REHABILITATION | Age: 74
End: 2024-01-22
Attending: FAMILY MEDICINE
Payer: COMMERCIAL

## 2024-01-22 DIAGNOSIS — M79.18 GLUTEAL PAIN: Primary | ICD-10-CM

## 2024-01-22 PROCEDURE — 97110 THERAPEUTIC EXERCISES: CPT | Mod: GP

## 2024-01-22 PROCEDURE — 97112 NEUROMUSCULAR REEDUCATION: CPT | Mod: GP

## 2024-01-30 ENCOUNTER — TELEPHONE (OUTPATIENT)
Dept: FAMILY MEDICINE | Facility: CLINIC | Age: 74
End: 2024-01-30
Payer: COMMERCIAL

## 2024-01-30 NOTE — TELEPHONE ENCOUNTER
Spoke to patient to reschedule for the day Highlisa will be out 2/6/24 & she states that she is doing very good right now on meds, feeling better and was only going to tell Highness this so doesn't feel she needs to reschedule right now.  Wanted me to relay the message and tell you that if you she needs to schedule another appt you can contact her via [employer]t but she feels great.

## 2024-02-01 ENCOUNTER — THERAPY VISIT (OUTPATIENT)
Dept: PHYSICAL THERAPY | Facility: REHABILITATION | Age: 74
End: 2024-02-01
Payer: COMMERCIAL

## 2024-02-01 DIAGNOSIS — G89.29 CHRONIC BILATERAL LOW BACK PAIN WITH BILATERAL SCIATICA: ICD-10-CM

## 2024-02-01 DIAGNOSIS — M54.41 CHRONIC BILATERAL LOW BACK PAIN WITH BILATERAL SCIATICA: ICD-10-CM

## 2024-02-01 DIAGNOSIS — M54.42 CHRONIC BILATERAL LOW BACK PAIN WITH BILATERAL SCIATICA: ICD-10-CM

## 2024-02-01 DIAGNOSIS — M79.18 GLUTEAL PAIN: Primary | ICD-10-CM

## 2024-02-01 PROCEDURE — 97110 THERAPEUTIC EXERCISES: CPT | Mod: GP

## 2024-02-01 PROCEDURE — 97112 NEUROMUSCULAR REEDUCATION: CPT | Mod: GP

## 2024-02-08 ENCOUNTER — THERAPY VISIT (OUTPATIENT)
Dept: PHYSICAL THERAPY | Facility: REHABILITATION | Age: 74
End: 2024-02-08
Payer: COMMERCIAL

## 2024-02-08 DIAGNOSIS — M54.42 CHRONIC BILATERAL LOW BACK PAIN WITH BILATERAL SCIATICA: Primary | ICD-10-CM

## 2024-02-08 DIAGNOSIS — G89.29 CHRONIC BILATERAL LOW BACK PAIN WITH BILATERAL SCIATICA: Primary | ICD-10-CM

## 2024-02-08 DIAGNOSIS — M79.18 GLUTEAL PAIN: ICD-10-CM

## 2024-02-08 DIAGNOSIS — M54.41 CHRONIC BILATERAL LOW BACK PAIN WITH BILATERAL SCIATICA: Primary | ICD-10-CM

## 2024-02-08 PROCEDURE — 97110 THERAPEUTIC EXERCISES: CPT | Mod: GP

## 2024-02-08 PROCEDURE — 97112 NEUROMUSCULAR REEDUCATION: CPT | Mod: GP

## 2024-02-15 ENCOUNTER — THERAPY VISIT (OUTPATIENT)
Dept: PHYSICAL THERAPY | Facility: REHABILITATION | Age: 74
End: 2024-02-15
Payer: COMMERCIAL

## 2024-02-15 DIAGNOSIS — M79.18 GLUTEAL PAIN: Primary | ICD-10-CM

## 2024-02-15 DIAGNOSIS — G89.29 CHRONIC BILATERAL LOW BACK PAIN WITH BILATERAL SCIATICA: ICD-10-CM

## 2024-02-15 DIAGNOSIS — M54.42 CHRONIC BILATERAL LOW BACK PAIN WITH BILATERAL SCIATICA: ICD-10-CM

## 2024-02-15 DIAGNOSIS — M54.41 CHRONIC BILATERAL LOW BACK PAIN WITH BILATERAL SCIATICA: ICD-10-CM

## 2024-02-15 PROCEDURE — 97110 THERAPEUTIC EXERCISES: CPT | Mod: GP

## 2024-03-27 ENCOUNTER — ANCILLARY PROCEDURE (OUTPATIENT)
Dept: BONE DENSITY | Facility: CLINIC | Age: 74
End: 2024-03-27
Attending: INTERNAL MEDICINE
Payer: COMMERCIAL

## 2024-03-27 ENCOUNTER — OFFICE VISIT (OUTPATIENT)
Dept: INTERNAL MEDICINE | Facility: CLINIC | Age: 74
End: 2024-03-27
Payer: COMMERCIAL

## 2024-03-27 VITALS
DIASTOLIC BLOOD PRESSURE: 62 MMHG | RESPIRATION RATE: 26 BRPM | HEIGHT: 62 IN | OXYGEN SATURATION: 94 % | WEIGHT: 133.4 LBS | SYSTOLIC BLOOD PRESSURE: 120 MMHG | BODY MASS INDEX: 24.55 KG/M2 | HEART RATE: 69 BPM

## 2024-03-27 DIAGNOSIS — M87.180 OSTEONECROSIS OF JAW DUE TO DRUG (H): ICD-10-CM

## 2024-03-27 DIAGNOSIS — M81.0 AGE RELATED OSTEOPOROSIS, UNSPECIFIED PATHOLOGICAL FRACTURE PRESENCE: ICD-10-CM

## 2024-03-27 DIAGNOSIS — Z92.29 PERSONAL HISTORY OF OTHER DRUG THERAPY: ICD-10-CM

## 2024-03-27 DIAGNOSIS — M81.0 AGE RELATED OSTEOPOROSIS, UNSPECIFIED PATHOLOGICAL FRACTURE PRESENCE: Primary | ICD-10-CM

## 2024-03-27 PROCEDURE — 99214 OFFICE O/P EST MOD 30 MIN: CPT | Mod: 25 | Performed by: INTERNAL MEDICINE

## 2024-03-27 PROCEDURE — 96372 THER/PROPH/DIAG INJ SC/IM: CPT | Performed by: INTERNAL MEDICINE

## 2024-03-27 PROCEDURE — 77080 DXA BONE DENSITY AXIAL: CPT | Mod: TC | Performed by: PHYSICIAN ASSISTANT

## 2024-03-27 PROCEDURE — 77089 TBS DXA CAL W/I&R FX RISK: CPT | Performed by: PHYSICIAN ASSISTANT

## 2024-03-27 NOTE — PATIENT INSTRUCTIONS
Prolia 20th today.  Prolia 21st in 6 months with my CA. If you want to start Evenit, please send me the message in August so I can get it covered before mid September.    DXA in 2 years .   Phone number to schedule [phone].    Daily calcium need is 8038-9162 mg a day from the diet and supplements.  Calcium citrate is easier to digest.  Vitamin D 2000 IU daily recommended.    Risk of rebound vertebral fractures is higher when Prolia suddenly stopped or dose was missed.      Prolia and Covid vaccine should be  for at least a week.

## 2024-03-28 NOTE — PROGRESS NOTES
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 Fosamax [id number]  2. Boniva [id number]  3. Forteo [id number]  4. Prolia 8982-1578. She had Prolia stopped in Dec 2014 because of the ONJ. Prolia was restarted in 10/2016. Tolerating it well. She dd get Prolia every 6 months in the last 3 years.    She had 3rd left hand MCP bone fracture last summer. Had the splint only for the treatment.     She denies symptoms of celiac, kidney stones, and had the workup for secondary causes last year.    DXA was done today, discussed and reviewed:  -Lumbar Spine: L1-L2: BMD: 0.809 g/cm2. T-score: -3.0. Z-score: -1.2.   -RIGHT Hip Total: BMD: 0.909 g/cm2. T-score: -0.8. Z-score: 0.9.  -RIGHT Hip Femoral neck: BMD: 0.821 g/cm2. T-score: -1.6. Z-score: 0.3.  -LEFT Hip Total: BMD: 0.978 g/cm2. T-score: -0.2. Z-score: 1.4.  -LEFT Hip Femoral neck: BMD: 0.889 g/cm2. T-score: -1.1. Z-score: 0.8.  INTERVAL CHANGE  -There has been a 7.6% increase in lumbar spine BMD.  -There has been a 2.2% increase in bilateral hip BMD.    Labs reviewed and discussed:    Component      Latest Ref Rng 10/13/2023  1:36 PM   Sodium      135 - 145 mmol/L 140    Potassium      3.4 - 5.3 mmol/L 4.5    Carbon Dioxide (CO2)      22 - 29 mmol/L 30 (H)    Anion Gap      7 - 15 mmol/L 10    Urea Nitrogen      8.0 - 23.0 mg/dL 14.1    Creatinine      0.51 - 0.95 mg/dL 0.55    GFR Estimate      >60 mL/min/1.73m2 >90    Calcium      8.8 - 10.2 mg/dL 9.7    Chloride      98 - 107 mmol/L 100    Glucose      70 - 99 mg/dL 100 (H)    Alkaline Phosphatase      35 - 104 U/L 77    AST      0 - 45 U/L 33    ALT      0 - 50 U/L 42    Protein Total      6.4 - 8.3 g/dL 7.0    Albumin      3.5 - 5.2 g/dL 4.6    Bilirubin Total      <=1.2 mg/dL 0.6       Component      Latest Ref Rng  "10/13/2023  1:36 PM   Vitamin D, Total (25-Hydroxy)      20 - 50 ng/mL 54 (H)       ONJ Jaw in 2014.  She would like to continue with the Prolia and have a break of 6 months every 2-2-1/2 years.  We discussed high risk of rebound vertebral fractures when Prolia suddenly stopped and I would not recommend the Prolia break.  We also discussed Evenity and printed info provided today.     Patient was educated on safety of Prolia utilizing Patient Counseling Chart for Healthcare Providers, as outlined by the Prolia REMS progam.     The longitudinal plan of care for the diagnosis(es)/condition(s) as documented were addressed during this visit. Due to the added complexity in care, I will continue to support Zoila in the subsequent management and with ongoing continuity of care.     Return in about 6 months (around 9/27/2024) for Prolia with CSS.    Patient Instructions   Prolia 20th today.  Prolia 21st in 6 months with my CA. If you want to start Evenit, please send me the message in August so I can get it covered before mid September.    DXA in 2 years .   Phone number to schedule [phone].    Daily calcium need is 8295-9088 mg a day from the diet and supplements.  Calcium citrate is easier to digest.  Vitamin D 2000 IU daily recommended.    Risk of rebound vertebral fractures is higher when Prolia suddenly stopped or dose was missed.      Prolia and Covid vaccine should be  for at least a week.           /62   Pulse 69   Resp 26   Ht 1.581 m (5' 2.25\")   Wt 60.5 kg (133 lb 6.4 oz)   LMP  (LMP Unknown)   SpO2 94%   BMI 24.20 kg/m        Did you experience any problems with previous Prolia injection? no  Any medication change in the last 6 months? no  Did you take prednisone or other immunosupressant drugs in the last 6 months   (chemo, transplant, rheum, dermatology conditions)? no  Did you have any serious infection in the last 6 months?no  Any recent hospitalizations?no  Do you plan any dental " work in the next 2-3 months?no  How much calcium do you take daily from the diet and supplements?1200 mg  How much vit D do you take daily? 2000 IU  Last DXA?  Done today, Reviewed and discussed      Patient is here today for the Prolia injection. Patient tolerated previous injections well.   We discussed calcium and vit D daily needs today.       We discussed high risk of rebound vertebral fractures when Prolia suddenly stopped.    Next Prolia injection will be in 6 months.           This note has been dictated using voice recognition software. 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)    Gluteal pain       Current Outpatient Medications   Medication    acetaminophen (TYLENOL) 325 MG tablet    ascorbic acid (VITAMIN C) 1000 MG tablet    Azelastine HCl 137 MCG/SPRAY SOLN    CALCIUM ORAL    cetirizine (ZYRTEC) 10 MG tablet    cholecalciferol, vitamin D3, (VITAMIN D3) 2,000 unit cap    potassium 99 MG TABS    pramipexole (MIRAPEX) 0.25 MG tablet    rosuvastatin (CRESTOR) 20 MG tablet    VIT C/VIT E AC/LUT/COPPER/ZINC (PRESERVISION LUTEIN ORAL)    denosumab (PROLIA) 60 MG/ML SOSY injection     Current Facility-Administered Medications   Medication    denosumab (PROLIA) injection 60 mg

## 2024-03-29 ENCOUNTER — OFFICE VISIT (OUTPATIENT)
Dept: FAMILY MEDICINE | Facility: CLINIC | Age: 74
End: 2024-03-29
Payer: COMMERCIAL

## 2024-03-29 VITALS
TEMPERATURE: 98.4 F | HEIGHT: 62 IN | DIASTOLIC BLOOD PRESSURE: 49 MMHG | BODY MASS INDEX: 24.48 KG/M2 | SYSTOLIC BLOOD PRESSURE: 120 MMHG | RESPIRATION RATE: 16 BRPM | HEART RATE: 68 BPM | WEIGHT: 133 LBS | OXYGEN SATURATION: 97 %

## 2024-03-29 DIAGNOSIS — M79.18 LEFT BUTTOCK PAIN: ICD-10-CM

## 2024-03-29 DIAGNOSIS — G89.29 CHRONIC BILATERAL LOW BACK PAIN WITHOUT SCIATICA: Primary | ICD-10-CM

## 2024-03-29 DIAGNOSIS — M54.50 CHRONIC BILATERAL LOW BACK PAIN WITHOUT SCIATICA: Primary | ICD-10-CM

## 2024-03-29 PROCEDURE — 99214 OFFICE O/P EST MOD 30 MIN: CPT | Performed by: FAMILY MEDICINE

## 2024-03-29 ASSESSMENT — ENCOUNTER SYMPTOMS: BACK PAIN: 1

## 2024-03-29 NOTE — PROGRESS NOTES
"  Assessment & Plan     Chronic bilateral low back pain without sciatica  Recent XR unremarkable. PT didn't help. Will proceed with MRI  - MR Hip Left w/o Contrast  - MR Lumbar Spine w/o & w Contrast    Left buttock pain  Recent XR didn't reveal pathology, worsening, PT doesn't help. Takes tylenol and do icing. Will proceed with MRI.   - MR Hip Left w/o Contrast      The longitudinal plan of care for the diagnosis(es)/condition(s) as documented were addressed during this visit. Due to the added complexity in care, I will continue to support Zoila in the subsequent management and with ongoing continuity of care.            Subjective   Zoila is a 73 year old, presenting for the following health issues:  Back Pain (Left lower back radiating into left leg; discuss ordering MRI) and Results (Discuss bone density results done 3/27/24)        3/29/2024     3:02 PM   Additional Questions   Roomed by Lakeshia WASHINGTON LPN     History of Present Illness       Reason for visit:  Review of bone density scan and Prolia continuance    She eats 2-3 servings of fruits and vegetables daily.She consumes 1 sweetened beverage(s) daily.She exercises with enough effort to increase her heart rate 30 to 60 minutes per day.  She exercises with enough effort to increase her heart rate 6 days per week.   She is taking medications regularly.           Intermittent but persistent left buttock pain where hamstring starts, worse with bending over or doing lunges. Has done PT. Has had Xrs. Not improving. Also dealing with low back pain, no sciatica. H/o lumbar laminectomy. No red flag symptom.       Review of Systems  Constitutional, HEENT, cardiovascular, pulmonary, gi and gu systems are negative, except as otherwise noted.      Objective    /49   Pulse 68   Temp 98.4  F (36.9  C) (Oral)   Resp 16   Ht 1.581 m (5' 2.25\")   Wt 60.3 kg (133 lb)   LMP  (LMP Unknown)   SpO2 97%   BMI 24.13 kg/m    Body mass index is 24.13 kg/m .  Physical Exam "   GENERAL: alert and no distress  MS: no gross musculoskeletal defects noted, no edema  NEURO: Normal strength and tone, mentation intact and speech normal  BACK: no CVA tenderness, no paralumbar tenderness, tender to palpation of where hamstring head  PSYCH: mentation appears normal, affect normal/bright    Office Visit on 10/13/2023   Component Date Value Ref Range Status    Cholesterol 10/13/2023 163  <200 mg/dL Final    Triglycerides 10/13/2023 86  <150 mg/dL Final    Direct Measure HDL 10/13/2023 84  >=50 mg/dL Final    LDL Cholesterol Calculated 10/13/2023 62  <=100 mg/dL Final    Non HDL Cholesterol 10/13/2023 79  <130 mg/dL Final    Sodium 10/13/2023 140  135 - 145 mmol/L Final    Reference intervals for this test were updated on 09/26/2023 to more accurately reflect our healthy population. There may be differences in the flagging of prior results with similar values performed with this method. Interpretation of those prior results can be made in the context of the updated reference intervals.     Potassium 10/13/2023 4.5  3.4 - 5.3 mmol/L Final    Carbon Dioxide (CO2) 10/13/2023 30 (H)  22 - 29 mmol/L Final    Anion Gap 10/13/2023 10  7 - 15 mmol/L Final    Urea Nitrogen 10/13/2023 14.1  8.0 - 23.0 mg/dL Final    Creatinine 10/13/2023 0.55  0.51 - 0.95 mg/dL Final    GFR Estimate 10/13/2023 >90  >60 mL/min/1.73m2 Final    Calcium 10/13/2023 9.7  8.8 - 10.2 mg/dL Final    Chloride 10/13/2023 100  98 - 107 mmol/L Final    Glucose 10/13/2023 100 (H)  70 - 99 mg/dL Final    Alkaline Phosphatase 10/13/2023 77  35 - 104 U/L Final    AST 10/13/2023 33  0 - 45 U/L Final    Reference intervals for this test were updated on 6/12/2023 to more accurately reflect our healthy population. There may be differences in the flagging of prior results with similar values performed with this method. Interpretation of those prior results can be made in the context of the updated reference intervals.    ALT 10/13/2023 42  0 - 50  U/L Final    Reference intervals for this test were updated on 6/12/2023 to more accurately reflect our healthy population. There may be differences in the flagging of prior results with similar values performed with this method. Interpretation of those prior results can be made in the context of the updated reference intervals.      Protein Total 10/13/2023 7.0  6.4 - 8.3 g/dL Final    Albumin 10/13/2023 4.6  3.5 - 5.2 g/dL Final    Bilirubin Total 10/13/2023 0.6  <=1.2 mg/dL Final    Vitamin D, Total (25-Hydroxy) 10/13/2023 54 (H)  20 - 50 ng/mL Final    indicates supplementation, with increased risk of hypercalciuria    Ferritin 10/13/2023 141  11 - 328 ng/mL Final    Magnesium 10/13/2023 2.3  1.7 - 2.3 mg/dL Final           Signed Electronically by: Leelee Butcher MD

## 2024-04-20 DIAGNOSIS — G25.81 RESTLESS LEGS SYNDROME (RLS): ICD-10-CM

## 2024-04-23 RX ORDER — PRAMIPEXOLE DIHYDROCHLORIDE 0.25 MG/1
TABLET ORAL
Qty: 270 TABLET | Refills: 1 | Status: SHIPPED | OUTPATIENT
Start: 2024-04-23

## 2024-07-19 DIAGNOSIS — E78.5 HYPERLIPIDEMIA, UNSPECIFIED HYPERLIPIDEMIA TYPE: ICD-10-CM

## 2024-07-23 ENCOUNTER — MYC REFILL (OUTPATIENT)
Dept: FAMILY MEDICINE | Facility: CLINIC | Age: 74
End: 2024-07-23
Payer: COMMERCIAL

## 2024-07-23 DIAGNOSIS — E78.5 HYPERLIPIDEMIA, UNSPECIFIED HYPERLIPIDEMIA TYPE: ICD-10-CM

## 2024-07-24 RX ORDER — ROSUVASTATIN CALCIUM 20 MG/1
20 TABLET, COATED ORAL DAILY
Qty: 90 TABLET | Refills: 1 | Status: SHIPPED | OUTPATIENT
Start: 2024-07-24

## 2024-07-31 RX ORDER — ROSUVASTATIN CALCIUM 20 MG/1
20 TABLET, COATED ORAL DAILY
Qty: 90 TABLET | Refills: 3 | Status: SHIPPED | OUTPATIENT
Start: 2024-07-31

## 2024-08-09 PROBLEM — M79.18 GLUTEAL PAIN: Status: RESOLVED | Noted: 2024-01-15 | Resolved: 2024-08-09

## 2024-08-09 NOTE — PROGRESS NOTES
DISCHARGE  Reason for Discharge: Patient has met all goals.    Equipment Issued: none    Discharge Plan: Patient to continue home program.    Referring Provider:  Leelee Butcher     3-8-2024      TCC was applied to the RLE following RLE dressing:           Location: Right plantar lateral foot ulcer     Procedure was Performed by:  Syed Wolff MD        The patient is at the wound department for total contact cast application:     Total Contact Cast - EZ     Extremity: Left lower extremity.     Patient preparation: After informed consent obtained, foam dressing was applied to the ulcer/wound area and secured with paper tape, and stockinet was placed over the entire foot extending to knee.  Protective felt padding was placed so the circular flaps extended over the malleoli with shorter narrower portion of the felt padding towards the knee. This was secured in placed with plastic tape along the shin and malleoli.  The remaining protective felt padding was loosely wrapped to cover toes and plantar surface of the foot leaving a finger's width space beyond longest toe.  The felt padding was secured in place with plastic tape at dorsum of foot under arch and behind heel with excess padding cut to allow proximal 1-3 inches of padding beyond heel. Corners were trimmed of the heel for optimal cast contact.  The thick white sleeve was rolled over toes extending toward knee leaving 2 inches of stockinet exposed. There was 2-4 inches of excess sleeve beyond the toes this was folded over the dorsum of foot and secured in place with plastic tape.     Casting:  Water temperature measured from 70-75° was used and cast sock was submerged for 5 full seconds. The cast sock was then applied and rolled in extended beyond toes by approximately 2-3 inches. The cast sock was enrolled towards the knee and the proximal edge of stockinet distally was folded to cover all loose edges of the cast sock.  Patient was placed in 90° neutral position maintained for 2-3 minutes to allow the cast a firm enough and patient was placed in continue neutral position for approximately 15 minutes to allow cast to cool and harden.  Patient was placed in the outer boot, straps were secured and patient was discharged.      I was present for significant portions of the total contact casting procedure.  Patient tolerated the procedure well.  The patient was provided with an instruction sheet and an emergency removal card, with instructions to present to the ED for removal of the cast if any difficulty after clinic hours.        Complications:  None.

## 2024-09-27 ENCOUNTER — ALLIED HEALTH/NURSE VISIT (OUTPATIENT)
Dept: FAMILY MEDICINE | Facility: CLINIC | Age: 74
End: 2024-09-27
Payer: COMMERCIAL

## 2024-09-27 DIAGNOSIS — M81.0 AGE RELATED OSTEOPOROSIS, UNSPECIFIED PATHOLOGICAL FRACTURE PRESENCE: Primary | ICD-10-CM

## 2024-09-27 PROCEDURE — 99207 PR NO CHARGE NURSE ONLY: CPT

## 2024-09-27 PROCEDURE — 96372 THER/PROPH/DIAG INJ SC/IM: CPT | Performed by: INTERNAL MEDICINE

## 2024-09-27 NOTE — PROGRESS NOTES
Clinic Administered Medication Documentation      Prolia Documentation    Indication: Prolia  (denosumab) is a prescription medicine used to treat osteoporosis in patients who:   Are at high risk for fracture, meaning patients who have had a fracture related to osteoporosis, or who have multiple risk factors for fracture.  Cannot use another osteoporosis medicine or other osteoporosis medicines did not work well.  The timeline for early/late injections would be 4 weeks early and any time after the 6 month heladio. If a patient receives their injection late, then the subsequent injection would be 6 months from the date that they actually received the injection.    When was the last injection?  3/27/24  Was the last injection at least 6 months ago? Yes  Has the prior authorization been completed?  Yes  Is there an active order (written within the past 365 days, with administrations remaining, not ) in the chart?  Yes   GFR Estimate   Date Value Ref Range Status   10/13/2023 >90 >60 mL/min/1.73m2 Final   2021 >60 >60 mL/min/1.73m2 Final     Has patient had a GFR within the last 12 months? Yes   Is GFR under 30, or patient has a diagnosis of CKD4 or CKD5? No   Patient denies gastric bypass or parathyroid surgery in past 6 months? Yes - patient denies.   Patient denies dental work in the past two months involving drilling into the bone, such as implants/extractions, oral surgery or a tooth extraction that has not healed yet?  Yes  Patient denies plans for an emergency tooth extraction within the next week? Yes    The following steps were completed to comply with the REMS program for Prolia:  Reviewed information in the Medication Guide, including the serious risks of Prolia  and the symptoms of each risk.  Advised patient to seek prompt medical attention if they have signs or symptoms of any of the serious risks.  Provided each patient a copy of the Medication Guide and Patient Guide.    Prior to injection,  verified patient identity using patient's name and date of birth. Medication was administered. Please see MAR and medication order for additional information. Patient instructed to remain in clinic for 15 minutes and report any adverse reaction to staff immediately.    Vial/Syringe: Syringe  Was this medication supplied by the patient? No  Verified that the patient has refills remaining in their prescription.

## 2024-11-12 ENCOUNTER — MYC MEDICAL ADVICE (OUTPATIENT)
Dept: FAMILY MEDICINE | Facility: CLINIC | Age: 74
End: 2024-11-12
Payer: COMMERCIAL

## 2024-11-12 DIAGNOSIS — E78.5 HYPERLIPIDEMIA, UNSPECIFIED HYPERLIPIDEMIA TYPE: ICD-10-CM

## 2024-11-12 DIAGNOSIS — R93.89 ABNORMAL FINDINGS ON DIAGNOSTIC IMAGING OF OTHER SPECIFIED BODY STRUCTURES: ICD-10-CM

## 2024-11-12 DIAGNOSIS — M81.0 AGE RELATED OSTEOPOROSIS, UNSPECIFIED PATHOLOGICAL FRACTURE PRESENCE: Primary | ICD-10-CM

## 2024-11-12 DIAGNOSIS — R79.9 ABNORMAL FINDING OF BLOOD CHEMISTRY, UNSPECIFIED: ICD-10-CM

## 2024-11-12 DIAGNOSIS — G25.81 RESTLESS LEGS SYNDROME (RLS): ICD-10-CM

## 2024-11-12 DIAGNOSIS — I10 ESSENTIAL HYPERTENSION: ICD-10-CM

## 2024-11-14 ENCOUNTER — OFFICE VISIT (OUTPATIENT)
Dept: FAMILY MEDICINE | Facility: CLINIC | Age: 74
End: 2024-11-14
Payer: COMMERCIAL

## 2024-11-14 ENCOUNTER — ANCILLARY PROCEDURE (OUTPATIENT)
Dept: GENERAL RADIOLOGY | Facility: CLINIC | Age: 74
End: 2024-11-14
Attending: FAMILY MEDICINE
Payer: COMMERCIAL

## 2024-11-14 VITALS
DIASTOLIC BLOOD PRESSURE: 65 MMHG | SYSTOLIC BLOOD PRESSURE: 136 MMHG | HEIGHT: 62 IN | BODY MASS INDEX: 24.11 KG/M2 | OXYGEN SATURATION: 97 % | HEART RATE: 71 BPM | WEIGHT: 131 LBS | TEMPERATURE: 97.8 F | RESPIRATION RATE: 14 BRPM

## 2024-11-14 DIAGNOSIS — S13.9XXA SPRAIN OF CERVICAL NECK, INITIAL ENCOUNTER: ICD-10-CM

## 2024-11-14 DIAGNOSIS — Z65.9 OTHER SOCIAL STRESSOR: ICD-10-CM

## 2024-11-14 DIAGNOSIS — M50.90 CERVICAL NECK PAIN WITH EVIDENCE OF DISC DISEASE: Primary | ICD-10-CM

## 2024-11-14 DIAGNOSIS — R53.83 CAREGIVER WITH FATIGUE: ICD-10-CM

## 2024-11-14 PROCEDURE — 99214 OFFICE O/P EST MOD 30 MIN: CPT | Performed by: FAMILY MEDICINE

## 2024-11-14 PROCEDURE — 72040 X-RAY EXAM NECK SPINE 2-3 VW: CPT | Mod: TC | Performed by: RADIOLOGY

## 2024-11-14 PROCEDURE — G2211 COMPLEX E/M VISIT ADD ON: HCPCS | Performed by: FAMILY MEDICINE

## 2024-11-14 RX ORDER — METHOCARBAMOL 500 MG/1
500 TABLET, FILM COATED ORAL 3 TIMES DAILY PRN
Qty: 90 TABLET | Refills: 0 | Status: SHIPPED | OUTPATIENT
Start: 2024-11-14

## 2024-11-14 RX ORDER — GABAPENTIN 100 MG/1
100 CAPSULE ORAL AT BEDTIME
Qty: 90 CAPSULE | Refills: 0 | Status: SHIPPED | OUTPATIENT
Start: 2024-11-14

## 2024-11-14 NOTE — PROGRESS NOTES
Assessment & Plan     Cervical neck pain with evidence of disc disease  Sprain of cervical neck, initial encounter  XR obtained and personally reviewed.  There is moderate to advanced arthritic changes throughout the cervical spine but nothing concerning serious.  Most likely cervical strain of her neck as well.  Will trial Robaxin and gabapentin.  She has an appointment with me in a month for annual wellness so can follow-up then or sooner if needed.  - XR Cervical Spine 2/3 Views  - methocarbamol (ROBAXIN) 500 MG tablet  Dispense: 90 tablet; Refill: 0  - gabapentin (NEURONTIN) 100 MG capsule  Dispense: 90 capsule; Refill: 0    Caregiver with fatigue  Other social stressor  Discussed during visit.  with multiple medical diagnoses. Also scammed financially. Monitor for now.         The longitudinal plan of care for the diagnosis(es)/condition(s) as documented were addressed during this visit. Due to the added complexity in care, I will continue to support Zoila in the subsequent management and with ongoing continuity of care.      Subjective   Zoila is a 74 year old, presenting for the following health issues:  Pain (Neck pain starts in the evening and last through out the night. X 1.5 months)        11/14/2024     2:37 PM   Additional Questions   Roomed by Leticia GRIFFIN CMA     Pain    History of Present Illness       Reason for visit:  Neck pain  Symptom onset:  More than a month  Symptoms include:  Pain that runs from the base of my skull to my shoulder on the left side  Symptom intensity:  Severe  Symptom progression:  Staying the same  Had these symptoms before:  No  What makes it worse:  Moving my head or rolling over in bed  What makes it better:  Getting up and moving helps, but does not eliminate the pain. I have no symptoms during the day.   She is taking medications regularly.           She's been having a hard year. Her  has been having surgeries and medical issues and now he's on  "supplemental oxygen. She's stressed. She was also scammed and lost pretty much all of her money this past summer.  She has been having neck pain. No longer having low back pain and left buttock pain so she didn't do MRI hip/lumbar spine that I ordered back in 3/2024. Reports neck pain in the left skull base radiating down to top of left shoulder. Turning her head will exacerbate the pain. Denies numbness/tingling or weakness. Has tried tylenol, stretching but this hasn't helped much.       Review of Systems  Constitutional, HEENT, cardiovascular, pulmonary, gi and gu systems are negative, except as otherwise noted.      Objective    /65 (BP Location: Left arm, Patient Position: Sitting, Cuff Size: Adult Regular)   Pulse 71   Temp 97.8  F (36.6  C) (Oral)   Resp 14   Ht 1.581 m (5' 2.25\")   Wt 59.4 kg (131 lb)   LMP  (LMP Unknown)   SpO2 97%   BMI 23.77 kg/m    Body mass index is 23.77 kg/m .  Physical Exam   GENERAL: alert and no distress  NECK: no adenopathy, no asymmetry, masses, or scars  RESP: lungs clear to auscultation - no rales, rhonchi or wheezes  CV: regular rate and rhythm, normal S1 S2, no S3 or S4, no murmur  MS: no gross musculoskeletal defects noted, no edema, TTP left sided paracervical musculature, no neurological deficit, spurling's negative. Decreased rom of neck to due pain.   SKIN: no suspicious lesions or rashes  NEURO: Normal strength and tone, mentation intact and speech normal  PSYCH: mentation appears normal, affect normal/bright    Office Visit on 10/13/2023   Component Date Value Ref Range Status    Cholesterol 10/13/2023 163  <200 mg/dL Final    Triglycerides 10/13/2023 86  <150 mg/dL Final    Direct Measure HDL 10/13/2023 84  >=50 mg/dL Final    LDL Cholesterol Calculated 10/13/2023 62  <=100 mg/dL Final    Non HDL Cholesterol 10/13/2023 79  <130 mg/dL Final    Sodium 10/13/2023 140  135 - 145 mmol/L Final    Reference intervals for this test were updated on 09/26/2023 to " more accurately reflect our healthy population. There may be differences in the flagging of prior results with similar values performed with this method. Interpretation of those prior results can be made in the context of the updated reference intervals.     Potassium 10/13/2023 4.5  3.4 - 5.3 mmol/L Final    Carbon Dioxide (CO2) 10/13/2023 30 (H)  22 - 29 mmol/L Final    Anion Gap 10/13/2023 10  7 - 15 mmol/L Final    Urea Nitrogen 10/13/2023 14.1  8.0 - 23.0 mg/dL Final    Creatinine 10/13/2023 0.55  0.51 - 0.95 mg/dL Final    GFR Estimate 10/13/2023 >90  >60 mL/min/1.73m2 Final    Calcium 10/13/2023 9.7  8.8 - 10.2 mg/dL Final    Chloride 10/13/2023 100  98 - 107 mmol/L Final    Glucose 10/13/2023 100 (H)  70 - 99 mg/dL Final    Alkaline Phosphatase 10/13/2023 77  35 - 104 U/L Final    AST 10/13/2023 33  0 - 45 U/L Final    Reference intervals for this test were updated on 6/12/2023 to more accurately reflect our healthy population. There may be differences in the flagging of prior results with similar values performed with this method. Interpretation of those prior results can be made in the context of the updated reference intervals.    ALT 10/13/2023 42  0 - 50 U/L Final    Reference intervals for this test were updated on 6/12/2023 to more accurately reflect our healthy population. There may be differences in the flagging of prior results with similar values performed with this method. Interpretation of those prior results can be made in the context of the updated reference intervals.      Protein Total 10/13/2023 7.0  6.4 - 8.3 g/dL Final    Albumin 10/13/2023 4.6  3.5 - 5.2 g/dL Final    Bilirubin Total 10/13/2023 0.6  <=1.2 mg/dL Final    Vitamin D, Total (25-Hydroxy) 10/13/2023 54 (H)  20 - 50 ng/mL Final    indicates supplementation, with increased risk of hypercalciuria    Ferritin 10/13/2023 141  11 - 328 ng/mL Final    Magnesium 10/13/2023 2.3  1.7 - 2.3 mg/dL Final           Signed Electronically by:  Leelee Butcher MD

## 2024-12-16 DIAGNOSIS — S13.9XXA SPRAIN OF CERVICAL NECK, INITIAL ENCOUNTER: ICD-10-CM

## 2024-12-17 ENCOUNTER — MYC REFILL (OUTPATIENT)
Dept: FAMILY MEDICINE | Facility: CLINIC | Age: 74
End: 2024-12-17
Payer: COMMERCIAL

## 2024-12-17 DIAGNOSIS — S13.9XXA SPRAIN OF CERVICAL NECK, INITIAL ENCOUNTER: ICD-10-CM

## 2024-12-17 RX ORDER — METHOCARBAMOL 500 MG/1
TABLET, FILM COATED ORAL
Qty: 90 TABLET | Refills: 0 | Status: SHIPPED | OUTPATIENT
Start: 2024-12-17

## 2024-12-19 RX ORDER — GABAPENTIN 100 MG/1
100 CAPSULE ORAL AT BEDTIME
Qty: 90 CAPSULE | Refills: 0 | Status: SHIPPED | OUTPATIENT
Start: 2024-12-19

## 2025-01-15 ENCOUNTER — HOSPITAL ENCOUNTER (OUTPATIENT)
Dept: MRI IMAGING | Facility: HOSPITAL | Age: 75
Discharge: HOME | End: 2025-01-15
Attending: FAMILY MEDICINE
Payer: COMMERCIAL

## 2025-01-15 DIAGNOSIS — M50.90 CERVICAL NECK PAIN WITH EVIDENCE OF DISC DISEASE: ICD-10-CM

## 2025-01-15 PROCEDURE — 72156 MRI NECK SPINE W/O & W/DYE: CPT

## 2025-01-15 PROCEDURE — A9585 GADOBUTROL INJECTION: HCPCS | Performed by: FAMILY MEDICINE

## 2025-01-15 PROCEDURE — 255N000002 HC RX 255 OP 636: Performed by: FAMILY MEDICINE

## 2025-01-15 RX ORDER — GADOBUTROL 604.72 MG/ML
0.1 INJECTION INTRAVENOUS ONCE
Status: COMPLETED | OUTPATIENT
Start: 2025-01-15 | End: 2025-01-15

## 2025-01-15 RX ADMIN — GADOBUTROL 6 ML: 604.72 INJECTION INTRAVENOUS at 15:35

## 2025-01-20 ENCOUNTER — MYC MEDICAL ADVICE (OUTPATIENT)
Dept: FAMILY MEDICINE | Facility: CLINIC | Age: 75
End: 2025-01-20
Payer: COMMERCIAL

## 2025-01-20 DIAGNOSIS — S13.9XXA SPRAIN OF CERVICAL NECK, INITIAL ENCOUNTER: ICD-10-CM

## 2025-01-21 RX ORDER — GABAPENTIN 300 MG/1
300 CAPSULE ORAL AT BEDTIME
Qty: 90 CAPSULE | Refills: 1 | Status: SHIPPED | OUTPATIENT
Start: 2025-01-21

## 2025-01-26 DIAGNOSIS — G25.81 RESTLESS LEGS SYNDROME (RLS): ICD-10-CM

## 2025-01-27 RX ORDER — PRAMIPEXOLE DIHYDROCHLORIDE 0.25 MG/1
TABLET ORAL
Qty: 270 TABLET | Refills: 0 | Status: SHIPPED | OUTPATIENT
Start: 2025-01-27

## 2025-02-03 ENCOUNTER — MYC MEDICAL ADVICE (OUTPATIENT)
Dept: FAMILY MEDICINE | Facility: CLINIC | Age: 75
End: 2025-02-03
Payer: COMMERCIAL

## 2025-02-12 ENCOUNTER — TRANSFERRED RECORDS (OUTPATIENT)
Dept: HEALTH INFORMATION MANAGEMENT | Facility: CLINIC | Age: 75
End: 2025-02-12
Payer: COMMERCIAL

## 2025-03-12 DIAGNOSIS — M81.0 AGE-RELATED OSTEOPOROSIS WITHOUT CURRENT PATHOLOGICAL FRACTURE: Primary | ICD-10-CM

## 2025-03-12 DIAGNOSIS — M81.0 AGE RELATED OSTEOPOROSIS, UNSPECIFIED PATHOLOGICAL FRACTURE PRESENCE: ICD-10-CM

## 2025-03-12 DIAGNOSIS — Z92.29 PERSONAL HISTORY OF OTHER DRUG THERAPY: ICD-10-CM

## 2025-03-27 ENCOUNTER — OFFICE VISIT (OUTPATIENT)
Dept: INTERNAL MEDICINE | Facility: CLINIC | Age: 75
End: 2025-03-27
Payer: COMMERCIAL

## 2025-03-27 VITALS
HEART RATE: 64 BPM | SYSTOLIC BLOOD PRESSURE: 122 MMHG | WEIGHT: 130.3 LBS | TEMPERATURE: 97.5 F | BODY MASS INDEX: 23.98 KG/M2 | OXYGEN SATURATION: 99 % | RESPIRATION RATE: 16 BRPM | HEIGHT: 62 IN | DIASTOLIC BLOOD PRESSURE: 82 MMHG

## 2025-03-27 DIAGNOSIS — M87.180 OSTEONECROSIS OF JAW DUE TO DRUG: ICD-10-CM

## 2025-03-27 DIAGNOSIS — Z92.29 PERSONAL HISTORY OF OTHER DRUG THERAPY: ICD-10-CM

## 2025-03-27 DIAGNOSIS — M81.0 AGE RELATED OSTEOPOROSIS, UNSPECIFIED PATHOLOGICAL FRACTURE PRESENCE: Primary | ICD-10-CM

## 2025-03-27 NOTE — PATIENT INSTRUCTIONS
Prolia 22nd today.  Prolia 23rd in 6 months with my nurse. I will see you in 1 year.    DXA in 4/2026 .   Phone number to schedule [phone].    Daily calcium need is 9509-6160 mg a day from the diet and supplements.  Calcium citrate is easier to digest.  Vitamin D 2000 IU daily recommended.    Risk of rebound vertebral fractures is higher when Prolia suddenly stopped or dose was missed.      Prolia and Covid vaccine should be  for at least a week.    Patient education:  Patients advised to maintain good oral hygiene during treatment, because of the risk for osteonecrosis of the jaw.   The risk of osteonecrosis of the jaw with Prolia therapy is extremely low.   If a patient is late for Prolia therapy (>3 wks) a rapid rebound of bone loss can occur which is highly concerning and important to try to prevent to optimize bone health.   Therefore if an invasive dental procedure is required, primarily extraction or implant, while on Prolia therapy, the recommendation is to time the dental procedure optimally 4 months after the most recent dose of Prolia allowing 6-8 weeks for healing prior to next dose of Prolia.   This is based on the updated 2022 Recommendations from the [employer] Association of Oral [employer] Maxill[employer].   We also recommend discussing with dentist or oral surgeon if pretreatment prophylactic oral rinses and antibiotics would be beneficial.   Optimizing oral hygiene before any invasive dental procedure significantly lowers ONJ risk.     There is a greater risk of severe hypocalcemia following denosumab administration and patient is advised that we will have to monitor calcium level.  Risk of infection is increased with denosumab use, so patient will inform me if has more frequent infections.     Atypical femur fracture  has been reported in patients receiving denosumab. The fractures may occur anywhere along the femoral shaft (may be bilateral) and commonly occur with minimal to no  trauma to the area. Some patients experience prodromal thigh or groin pain weeks or months before the fracture occurs. Contralateral limb should be assessed if AFF occurs.     Multiple vertebral column fracture has been reported following discontinuation of therapy. Hypertension and increased cholesterol were reported with denosumab use.      Discussed 10-year data of Prolia. Discussed the importance of being on time and consistent with Prolia use to prevent rapid bone of osteoclastic activity.  Discussed that if Prolia is discontinued we will likely need to utilize an antiresorptive, such as Reclast, to help prevent rapid rebound of osteoclast activity. Often more than 1 dose is required.  Labs yearly to ensure calcium and vitamin D optimized while patient on Prolia.

## 2025-03-29 ENCOUNTER — TRANSFERRED RECORDS (OUTPATIENT)
Dept: HEALTH INFORMATION MANAGEMENT | Facility: CLINIC | Age: 75
End: 2025-03-29
Payer: COMMERCIAL

## 2025-04-17 ENCOUNTER — OFFICE VISIT (OUTPATIENT)
Dept: FAMILY MEDICINE | Facility: CLINIC | Age: 75
End: 2025-04-17
Payer: COMMERCIAL

## 2025-04-17 VITALS
HEIGHT: 62 IN | HEART RATE: 84 BPM | TEMPERATURE: 98.6 F | RESPIRATION RATE: 14 BRPM | SYSTOLIC BLOOD PRESSURE: 136 MMHG | WEIGHT: 129.6 LBS | DIASTOLIC BLOOD PRESSURE: 58 MMHG | OXYGEN SATURATION: 95 % | BODY MASS INDEX: 23.85 KG/M2

## 2025-04-17 DIAGNOSIS — J32.9 SINUSITIS, UNSPECIFIED CHRONICITY, UNSPECIFIED LOCATION: Primary | ICD-10-CM

## 2025-04-17 RX ORDER — DOXYCYCLINE 100 MG/1
100 CAPSULE ORAL 2 TIMES DAILY
Qty: 20 CAPSULE | Refills: 0 | Status: SHIPPED | OUTPATIENT
Start: 2025-04-17

## 2025-04-17 NOTE — PROGRESS NOTES
Assessment & Plan     Sinusitis, unspecified chronicity, unspecified location  Patient signs symptoms consistent with sinusitis  Discussed medication options she would like to pursue doxycycline  Discussed if symptoms not improving to contact us back at clinic  - doxycycline hyclate (VIBRAMYCIN) 100 MG capsule  Dispense: 20 capsule; Refill: 0                  Subjective   Zoila is a 74 year old, presenting for the following health issues:  Sinus congestion (Has had a lot of nasal congestion for the last two weeks.  The discharge is starting to get more color and now getting a cough as well. Left side of face.  Cheek bone and upper teeth felt a little sore yesterday and a little today.  No fever.  )      4/17/2025     7:52 AM   Additional Questions   Roomed by BEATRIZ Estrella CMA([facility])     History of Present Illness       Reason for visit:  Sinus issues  Symptom onset:  1-2 weeks ago  Symptoms include:  Sneezing, runny nose, coughing, heavy mucus  Symptom intensity:  Moderate  Symptom progression:  Staying the same  Had these symptoms before:  Yes  Has tried/received treatment for these symptoms:  Yes  Previous treatment was successful:  Yes  Prior treatment description:  Antibiotic  What makes it worse:  Nothing seems to affect the severity.  What makes it better:  Warm compresses on my face   She is taking medications regularly.      Patient here with sinus congestion pressure more on the left than the right ear fullness and decrease in hearing increasing congestion with postnasal drainage that has been increasing in the last 2 weeks.  Patient initially after the first week that she is feeling little better than symptoms started worsening.  Here today no fevers no chills.  No cough.  Effusion seen bilaterally in the ears no erythema there is slight adenopathy anterior cervical chain bilaterally lungs are clear clinical history consistent with sinusitis will treat with doxycycline for 10 days.                Objective   "  /58   Pulse 84   Temp 98.6  F (37  C) (Oral)   Resp 14   Ht 1.581 m (5' 2.25\")   Wt 58.8 kg (129 lb 9.6 oz)   LMP  (LMP Unknown)   SpO2 95%   BMI 23.51 kg/m    Body mass index is 23.51 kg/m .  Physical Exam   GENERAL: alert and no distress  EYES: Eyes grossly normal to inspection, PERRL and conjunctivae and sclerae normal  HENT: normal cephalic/atraumatic, both ears: clear effusion, and posterior oropharynx drainage noted no exudate  RESP: lungs clear to auscultation - no rales, rhonchi or wheezes  CV: regular rate and rhythm, normal S1 S2, no S3 or S4, no murmur, click or rub, no peripheral edema  PSYCH: mentation appears normal, affect normal/bright            Signed Electronically by: Shorty Quezada MD    "

## 2025-04-24 DIAGNOSIS — G25.81 RESTLESS LEGS SYNDROME (RLS): ICD-10-CM

## 2025-04-24 RX ORDER — PRAMIPEXOLE DIHYDROCHLORIDE 0.25 MG/1
TABLET ORAL
Qty: 270 TABLET | Refills: 2 | Status: SHIPPED | OUTPATIENT
Start: 2025-04-24